# Patient Record
Sex: FEMALE | Race: BLACK OR AFRICAN AMERICAN | Employment: FULL TIME | URBAN - METROPOLITAN AREA
[De-identification: names, ages, dates, MRNs, and addresses within clinical notes are randomized per-mention and may not be internally consistent; named-entity substitution may affect disease eponyms.]

---

## 2019-03-27 ENCOUNTER — OFFICE VISIT (OUTPATIENT)
Dept: PRIMARY CARE CLINIC | Age: 34
End: 2019-03-27
Payer: COMMERCIAL

## 2019-03-27 VITALS
WEIGHT: 167.6 LBS | TEMPERATURE: 97.3 F | OXYGEN SATURATION: 96 % | HEIGHT: 63 IN | HEART RATE: 85 BPM | DIASTOLIC BLOOD PRESSURE: 83 MMHG | SYSTOLIC BLOOD PRESSURE: 127 MMHG | BODY MASS INDEX: 29.7 KG/M2

## 2019-03-27 DIAGNOSIS — M25.50 ARTHRALGIA, UNSPECIFIED JOINT: ICD-10-CM

## 2019-03-27 DIAGNOSIS — G62.9 NEUROPATHY: ICD-10-CM

## 2019-03-27 DIAGNOSIS — L98.9 LESION OF SKIN OF SCALP: Primary | ICD-10-CM

## 2019-03-27 DIAGNOSIS — K21.9 GASTROESOPHAGEAL REFLUX DISEASE WITHOUT ESOPHAGITIS: ICD-10-CM

## 2019-03-27 PROCEDURE — 99203 OFFICE O/P NEW LOW 30 MIN: CPT | Performed by: NURSE PRACTITIONER

## 2019-03-27 RX ORDER — RANITIDINE 150 MG/1
75 TABLET ORAL 2 TIMES DAILY PRN
Qty: 60 TABLET | Refills: 0 | Status: SHIPPED | OUTPATIENT
Start: 2019-03-27 | End: 2020-02-25 | Stop reason: ALTCHOICE

## 2019-03-27 RX ORDER — NICOTINE POLACRILEX 2 MG/1
LOZENGE ORAL
Refills: 0 | COMMUNITY
Start: 2019-03-11 | End: 2019-03-27 | Stop reason: ALTCHOICE

## 2019-03-27 RX ORDER — AZITHROMYCIN 250 MG/1
TABLET, FILM COATED ORAL
Refills: 0 | COMMUNITY
Start: 2019-03-11 | End: 2019-03-27 | Stop reason: ALTCHOICE

## 2019-03-27 ASSESSMENT — ENCOUNTER SYMPTOMS
HEARTBURN: 1
RHINORRHEA: 0
ABDOMINAL PAIN: 0
SHORTNESS OF BREATH: 1
COUGH: 1
DIARRHEA: 0
HEMOPTYSIS: 0
TROUBLE SWALLOWING: 0
WHEEZING: 0
CHOKING: 0
VOMITING: 0
BLOOD IN STOOL: 0

## 2019-03-27 ASSESSMENT — PATIENT HEALTH QUESTIONNAIRE - PHQ9
SUM OF ALL RESPONSES TO PHQ QUESTIONS 1-9: 0
SUM OF ALL RESPONSES TO PHQ9 QUESTIONS 1 & 2: 0
SUM OF ALL RESPONSES TO PHQ QUESTIONS 1-9: 0
1. LITTLE INTEREST OR PLEASURE IN DOING THINGS: 0
2. FEELING DOWN, DEPRESSED OR HOPELESS: 0

## 2019-04-08 ENCOUNTER — HOSPITAL ENCOUNTER (OUTPATIENT)
Age: 34
Discharge: HOME | End: 2019-04-08
Payer: COMMERCIAL

## 2019-04-08 DIAGNOSIS — G62.9 NEUROPATHY: ICD-10-CM

## 2019-04-08 DIAGNOSIS — K21.9 GASTROESOPHAGEAL REFLUX DISEASE WITHOUT ESOPHAGITIS: ICD-10-CM

## 2019-04-08 DIAGNOSIS — M25.50 ARTHRALGIA, UNSPECIFIED JOINT: ICD-10-CM

## 2019-04-08 LAB
ABSOLUTE EOS #: 0.18 K/UL (ref 0–0.44)
ABSOLUTE IMMATURE GRANULOCYTE: <0.03 K/UL (ref 0–0.3)
ABSOLUTE LYMPH #: 2.03 K/UL (ref 1.1–3.7)
ABSOLUTE MONO #: 0.42 K/UL (ref 0.1–1.2)
ALBUMIN SERPL-MCNC: 4.5 G/DL (ref 3.5–5.2)
ALBUMIN/GLOBULIN RATIO: 1.4 (ref 1–2.5)
ALP BLD-CCNC: 95 U/L (ref 35–104)
ALT SERPL-CCNC: 18 U/L (ref 5–33)
ANION GAP SERPL CALCULATED.3IONS-SCNC: 12 MMOL/L (ref 9–17)
AST SERPL-CCNC: 20 U/L
BASOPHILS # BLD: 1 % (ref 0–2)
BASOPHILS ABSOLUTE: 0.04 K/UL (ref 0–0.2)
BILIRUB SERPL-MCNC: 0.17 MG/DL (ref 0.3–1.2)
BUN BLDV-MCNC: 10 MG/DL (ref 6–20)
BUN/CREAT BLD: ABNORMAL (ref 9–20)
CALCIUM SERPL-MCNC: 9.7 MG/DL (ref 8.6–10.4)
CHLORIDE BLD-SCNC: 104 MMOL/L (ref 98–107)
CO2: 25 MMOL/L (ref 20–31)
CREAT SERPL-MCNC: 0.52 MG/DL (ref 0.5–0.9)
DIFFERENTIAL TYPE: ABNORMAL
EOSINOPHILS RELATIVE PERCENT: 3 % (ref 1–4)
GFR AFRICAN AMERICAN: >60 ML/MIN
GFR NON-AFRICAN AMERICAN: >60 ML/MIN
GFR SERPL CREATININE-BSD FRML MDRD: ABNORMAL ML/MIN/{1.73_M2}
GFR SERPL CREATININE-BSD FRML MDRD: ABNORMAL ML/MIN/{1.73_M2}
GLUCOSE BLD-MCNC: 97 MG/DL (ref 70–99)
HCT VFR BLD CALC: 43.6 % (ref 36.3–47.1)
HEMOGLOBIN: 14 G/DL (ref 11.9–15.1)
IMMATURE GRANULOCYTES: 0 %
LYMPHOCYTES # BLD: 35 % (ref 24–43)
MCH RBC QN AUTO: 27.2 PG (ref 25.2–33.5)
MCHC RBC AUTO-ENTMCNC: 32.1 G/DL (ref 28.4–34.8)
MCV RBC AUTO: 84.7 FL (ref 82.6–102.9)
MONOCYTES # BLD: 7 % (ref 3–12)
NRBC AUTOMATED: 0 PER 100 WBC
PDW BLD-RTO: 13.1 % (ref 11.8–14.4)
PLATELET # BLD: 247 K/UL (ref 138–453)
PLATELET ESTIMATE: ABNORMAL
PMV BLD AUTO: 10.1 FL (ref 8.1–13.5)
POTASSIUM SERPL-SCNC: 4.5 MMOL/L (ref 3.7–5.3)
RBC # BLD: 5.15 M/UL (ref 3.95–5.11)
RBC # BLD: ABNORMAL 10*6/UL
RHEUMATOID FACTOR: <10 IU/ML
SEG NEUTROPHILS: 54 % (ref 36–65)
SEGMENTED NEUTROPHILS ABSOLUTE COUNT: 3.16 K/UL (ref 1.5–8.1)
SODIUM BLD-SCNC: 141 MMOL/L (ref 135–144)
TOTAL PROTEIN: 7.8 G/DL (ref 6.4–8.3)
TSH SERPL DL<=0.05 MIU/L-ACNC: 2.85 MIU/L (ref 0.3–5)
WBC # BLD: 5.8 K/UL (ref 3.5–11.3)
WBC # BLD: ABNORMAL 10*3/UL

## 2019-04-08 PROCEDURE — 80053 COMPREHEN METABOLIC PANEL: CPT

## 2019-04-08 PROCEDURE — 86431 RHEUMATOID FACTOR QUANT: CPT

## 2019-04-08 PROCEDURE — 36415 COLL VENOUS BLD VENIPUNCTURE: CPT

## 2019-04-08 PROCEDURE — 86038 ANTINUCLEAR ANTIBODIES: CPT

## 2019-04-08 PROCEDURE — 85025 COMPLETE CBC W/AUTO DIFF WBC: CPT

## 2019-04-08 PROCEDURE — 84443 ASSAY THYROID STIM HORMONE: CPT

## 2019-04-09 DIAGNOSIS — G62.9 NEUROPATHY: ICD-10-CM

## 2019-04-09 DIAGNOSIS — M25.50 ARTHRALGIA, UNSPECIFIED JOINT: Primary | ICD-10-CM

## 2019-04-09 LAB — ANTI-NUCLEAR ANTIBODY (ANA): ABNORMAL

## 2019-04-17 ENCOUNTER — OFFICE VISIT (OUTPATIENT)
Dept: PRIMARY CARE CLINIC | Age: 34
End: 2019-04-17
Payer: COMMERCIAL

## 2019-04-17 VITALS
WEIGHT: 170 LBS | HEART RATE: 86 BPM | SYSTOLIC BLOOD PRESSURE: 127 MMHG | OXYGEN SATURATION: 97 % | TEMPERATURE: 98.2 F | BODY MASS INDEX: 30.11 KG/M2 | DIASTOLIC BLOOD PRESSURE: 88 MMHG

## 2019-04-17 DIAGNOSIS — M79.10 MYALGIA: ICD-10-CM

## 2019-04-17 DIAGNOSIS — R06.02 SHORTNESS OF BREATH: Primary | ICD-10-CM

## 2019-04-17 PROCEDURE — 99213 OFFICE O/P EST LOW 20 MIN: CPT | Performed by: NURSE PRACTITIONER

## 2019-04-17 ASSESSMENT — ENCOUNTER SYMPTOMS
BLOOD IN STOOL: 0
TROUBLE SWALLOWING: 0
VOMITING: 0
SHORTNESS OF BREATH: 1
ABDOMINAL PAIN: 0
WHEEZING: 0
CHOKING: 0
COUGH: 0
RHINORRHEA: 0
DIARRHEA: 0

## 2019-04-17 NOTE — PROGRESS NOTES
[facility]  [address] [place] [id number]  Dept: [phone]  Dept Fax: [phone]    2019     Patricia Deal (:  1985)is a 26 y.o. female, here for evaluation of the following medical concerns:   Chief Complaint   Patient presents with    Follow-up     3 wk f/u. Did labs as asked    Zantac PRN is helping control her reflux very well    Still having problems with legs feeling heavy and tingling after sitting for brief periods  Can sit have bowel movement and legs will numb      . Review of Systems   Constitutional: Negative for appetite change, chills, fatigue, fever and unexpected weight change. HENT: Negative for congestion, hearing loss, postnasal drip, rhinorrhea and trouble swallowing. Eyes: Negative for visual disturbance. Respiratory: Positive for shortness of breath (after going up stairs). Negative for cough, choking and wheezing. Cardiovascular: Negative for chest pain and palpitations. Gastrointestinal: Negative for abdominal pain, blood in stool, diarrhea and vomiting. Endocrine: Negative for polydipsia and polyuria. Genitourinary: Negative for frequency and hematuria. Musculoskeletal: Positive for arthralgias. Negative for myalgias and neck pain. Swelling in legs   Skin: Negative for rash. Allergic/Immunologic: Negative for environmental allergies. Neurological: Positive for numbness. Negative for dizziness, seizures, syncope and headaches. Hematological: Does not bruise/bleed easily. Psychiatric/Behavioral: Negative for suicidal ideas. The patient is not nervous/anxious. Prior to Visit Medications    Medication Sig Taking?  Authorizing Provider   ranitidine (ZANTAC) 150 MG tablet Take 0.5 tablets by mouth 2 times daily as needed for Heartburn Yes Bridger Andrade, APRN - CNP   Bismuth Subsalicylate 741 MG TABS Take 1 tablet by mouth as needed  Historical Provider, MD        Social History     Tobacco Use    Smoking status: Never Smoker    Smokeless tobacco: Never Used   Substance Use Topics    Alcohol use: Yes     Comment: ocasional        Vitals:    04/17/19 1148   BP: 127/88   Site: Left Upper Arm   Position: Sitting   Cuff Size: Medium Adult   Pulse: 86   Temp: 98.2 °F (36.8 °C)   TempSrc: Oral   SpO2: 97%   Weight: 170 lb (77.1 kg)     Estimated body mass index is 30.11 kg/m² as calculated from the following:    Height as of 3/27/19: 5' 3\" (1.6 m). Weight as of this encounter: 170 lb (77.1 kg). DIAGNOSTIC FINDINGS:  CBC:  Lab Results   Component Value Date    WBC 5.8 04/08/2019    HGB 14.0 04/08/2019     04/08/2019       BMP:    Lab Results   Component Value Date     04/08/2019    K 4.5 04/08/2019     04/08/2019    CO2 25 04/08/2019    BUN 10 04/08/2019    CREATININE 0.52 04/08/2019    GLUCOSE 97 04/08/2019       HEMOGLOBIN A1C: No results found for: LABA1C    FASTING LIPID PANEL:No results found for: CHOL, HDL, TRIG    Physical Exam   Constitutional: She is oriented to person, place, and time. She appears well-developed and well-nourished. She is cooperative. No distress. HENT:   Head: Normocephalic. Eyes: Pupils are equal, round, and reactive to light. No scleral icterus. Neck: Normal range of motion. Neck supple. Cardiovascular: Normal rate and regular rhythm. Pulmonary/Chest: Effort normal and breath sounds normal.   Abdominal: Soft. Musculoskeletal: She exhibits no edema. Neurological: She is alert and oriented to person, place, and time. Coordination normal.   Skin: Skin is warm and dry. Psychiatric: She has a normal mood and affect. Her behavior is normal. Judgment and thought content normal.   Nursing note and vitals reviewed. ASSESSMENT     Diagnosis Orders   1. Shortness of breath  Full PFT Study With Bronchodilator   2.  Myalgia            PLAN:  No orders of the defined types were placed in this encounter. 1.  RF, CBC, TSH and CMP all WNL. HAILY came back equivocal, will repeat in 3-4 week. 2. She is SOB with mild exertion, was very active in sports in her youth. No hx of asthma, non-smoker. Check PFT's      FOLLOW UP AND INSTRUCTIONS:  Return in about 1 month (around 5/15/2019). · Henri received counseling on the following healthy behaviors:exercise    · Discussed use, benefit, and side effects of prescribed medications. Barriers to medication compliance addressed. All patient questions answered. Pt verbalized understanding of all instructions given. · Patient given educational materials - see patient instructions      · Patient advised to contact scheduling offices for any referrals or imaging orders  placed today if they have not been contacted in 48 hours. Return in about 1 month (around 5/15/2019). An electronic signature was used to authenticate this note. --MIRA Lerma CNP on 4/17/2019 at 12:16 PM  Visit Information    Have you changed or started any medications since your last visit including any over-the-counter medicines, vitamins, or herbal medicines? no   Are you having any side effects from any of your medications? -  no  Have you stopped taking any of your medications? Is so, why? -  no    Have you seen any other physician or provider since your last visit? No  Have you had any other diagnostic tests since your last visit? Yes - Records Requested  Have you been seen in the emergency room and/or had an admission to a hospital since we last saw you? No  Have you had your routine dental cleaning in the past 6 months? yes    Have you activated your [employer] account? If not, what are your barriers?  No: pending       Patient Care Team:  MIRA Lerma CNP as PCP - General (Family Medicine)    Medical History Review  Past Medical, Family, and Social History reviewed and does not contribute to the patient presenting condition    Health Maintenance Topic Date Due    Varicella Vaccine (1 of 2 - 13+ 2-dose series) 11/28/1996    HIV screen  11/28/1998    DTaP/Tdap/Td vaccine (1 - Tdap) 11/28/2002    Cervical cancer screen  11/28/2004    Flu vaccine (Season Ended) 03/27/2020 (Originally 9/1/2019)    Pneumococcal 0-64 years Vaccine  Aged Out

## 2019-04-29 ENCOUNTER — OFFICE VISIT (OUTPATIENT)
Dept: DERMATOLOGY | Age: 34
End: 2019-04-29
Payer: COMMERCIAL

## 2019-04-29 VITALS
HEIGHT: 63 IN | HEART RATE: 101 BPM | WEIGHT: 172.8 LBS | BODY MASS INDEX: 30.62 KG/M2 | OXYGEN SATURATION: 98 % | SYSTOLIC BLOOD PRESSURE: 122 MMHG | DIASTOLIC BLOOD PRESSURE: 89 MMHG

## 2019-04-29 DIAGNOSIS — D48.5 NEOPLASM OF UNCERTAIN BEHAVIOR OF SKIN: ICD-10-CM

## 2019-04-29 DIAGNOSIS — L70.0 ACNE VULGARIS: Primary | ICD-10-CM

## 2019-04-29 PROCEDURE — 99202 OFFICE O/P NEW SF 15 MIN: CPT | Performed by: DERMATOLOGY

## 2019-04-29 RX ORDER — SPIRONOLACTONE 50 MG/1
TABLET, FILM COATED ORAL
Qty: 30 TABLET | Refills: 2 | Status: SHIPPED | OUTPATIENT
Start: 2019-04-29 | End: 2019-07-31 | Stop reason: SDUPTHER

## 2019-04-29 RX ORDER — CLINDAMYCIN PHOSPHATE 10 UG/ML
LOTION TOPICAL
Qty: 60 ML | Refills: 3 | Status: SHIPPED | OUTPATIENT
Start: 2019-04-29 | End: 2019-07-31 | Stop reason: SDUPTHER

## 2019-04-29 NOTE — PROGRESS NOTES
Dermatology Patient Note  [facility] DERMATOLOGY  [address] [facility]  [address] [place] [place] [address]  Dept: [phone]  Dept Fax: [phone]      VISITDATE: 4/29/2019   REFERRING PROVIDER: MIRA Watson *      Anisha Davila is a 26 y.o. female  who presents today in the office for:    New Patient (Pt states that she had a lesion in her scalp since she was 13. She has had no treatment for it. She wanted to be treated for acne as well. She states that she was tried on Differin Gel. )      HISTORY OF PRESENT ILLNESS:  26 y.o. female presenting for acne  Location: face, back  Duration: since teenage years  Symptoms: pain  Course: persistent  Exacerbating factors: unsure  Prior treatments: differin gel  Also complains of lesion on scalp that is painful, been there since 11      CURRENT MEDICATIONS:   Current Outpatient Medications   Medication Sig Dispense Refill    spironolactone (ALDACTONE) 50 MG tablet Take one half tablet PO daily x 7 days, then increase to full tablet daily 30 tablet 2    tretinoin (RETIN-A) 0.025 % cream Apply pea sized amount to face, chest and back nightly 45 g 3    clindamycin (CLEOCIN T) 1 % lotion Apply to face, chest and back daily 60 mL 3    ranitidine (ZANTAC) 150 MG tablet Take 0.5 tablets by mouth 2 times daily as needed for Heartburn 60 tablet 0     No current facility-administered medications for this visit. ALLERGIES:   Allergies   Allergen Reactions    Latex     Other      Some selective fruits       SOCIAL HISTORY:  Social History     Tobacco Use    Smoking status: Never Smoker    Smokeless tobacco: Never Used   Substance Use Topics    Alcohol use: Yes     Comment: ocasional       REVIEW OF SYSTEMS:  Review of Systems  Skin: Denies any new changing, growing orbleeding lesions or rashes except as described in the HPI   Constitutional: Denies fevers, chills, and malaise.     PHYSICAL EXAM:   /89 (Site: Right Upper Arm, Position: Sitting, Cuff Size: Large Adult)   Pulse 101   Ht 5' 3\" (1.6 m)   Wt 172 lb 12.8 oz (78.4 kg)   LMP 04/02/2019 (Approximate)   SpO2 98%   BMI 30.61 kg/m²     General Exam:  General Appearance: No acute distress, Well nourished     Neuro: Alert and oriented to person, place and time  Psych: Normal affect   Lymph Node: Not performed    Cutaneous Exam: Performed as documented in clinic note below. Acne exam, which includes the head/face, neck, chest, and back was performed    Pertinent Physical Exam Findings:  Physical Exam  Left occipital scalp with eroded pink-tan pedunculated nodule  Face with acneiform papules and comedones    Photo surveillance performed: No    Medical Necessity of Exam Performed:   Distribution of patient concerns    Additional Diagnostic Testing performed during exam: Not performed ,  Not performed    ASSESSMENT:   Diagnosis Orders   1. Acne vulgaris  spironolactone (ALDACTONE) 50 MG tablet    tretinoin (RETIN-A) 0.025 % cream    clindamycin (CLEOCIN T) 1 % lotion   2. Neoplasm of uncertain behavior of skin         Plan of Action is as Follows:  Assessment   1. Acne vulgaris, mild-mod mixed  As the patient has clinical and historical evidence of hormonally-driven acne, we jointly decided to begin spironolactone therapy. The patient denies any current or planned pregnancies, personal or familial estrogen-dependent malignancies, renal insufficiency, or clinically significant abnormal uterine bleeding. The patient was informed of common side effects such as increased urination/urinary frequency, menstrual irregularities with mid-cycle spotting, breast tenderness, decreased libido, fatigue, headache, and dizziness  Patient informed to stop taking medication and to immediately report any new onset muscle cramps or weakness, or palpitations. Patient informed that pregnancy needs to be avoided by use of birth control in order to prevent feminization and hypospadias of a male fetus.   Discussed risk of elevated potassium and the need to stay away from potassium supplements while on the medication.  - BPO wash qam  - spironolactone (ALDACTONE) 50 MG tablet; Take one half tablet PO daily x 7 days, then increase to full tablet daily  Dispense: 30 tablet; Refill: 2  - tretinoin (RETIN-A) 0.025 % cream; Apply pea sized amount to face, chest and back nightly  Dispense: 45 g; Refill: 3  - clindamycin (CLEOCIN T) 1 % lotion; Apply to face, chest and back daily  Dispense: 60 mL; Refill: 3    2. 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 Other, left occpital scalp  - patient wants to reschedule for shave removal            Patient Instructions   Mornin. Wash with over the counter benzoyl peroxide wash (examples include: Panoxyl Wash, Acne Free brand oil-free acne cleanser, Neutrogena Clear Pore Cleanser/Mask, Clean and Clear advantage 3 in 1 exfoliating cleanser, Clean and Clear Continuous Control Acne Cleanser, Oxy maximum face wash). Dry your face with white towel to prevent bleaching of clothing. 2. Apply clindamycin 1% lotion to the face. 3. Apply an oil-free, non-comedogenic moisturizer, ideally with SPF 15+ in it. Night time:   1. Wash with a gentle face wash (such as Cerave or Cetaphil)  2. Apply a pea-sized amount of Tretinoin 0.025% cream/onto your finger. Dab the medicine onto your forehead, nose, chin, and each cheek. Then gently spread a thin layer across the entire face. Do not wash off this medicine. These medications can also be used on your chest, back and shoulder areas. 3. Apply an oil-free, non-comedogenic moisturizer. 4. Take Spironolactone--start with 1/2 tablet at bedtime for 7 days. If well tolerated, increase to full tablet nightly. It is important to remember that oil glands respond very slowly and your skin will not change overnight.   Your skin may get worse during the first weeks of treatment because all of the clogged pores are opening to the surface. Try to be consistent and follow these instructions from your doctor. If your acne has not responded to these treatments in 2-3 months, your doctor may recommend other medications. Topical acne medications can cause irritation, redness, and dryness, especially when you first start them. If your prescribed topical cream or gel is too irritating at first, try using it every other day or once every 3 days instead of every day. As your skin becomes less sensitive, you may be able to start to use it every day. To help soothe the dryness, you can use an oil-free, \"non-comedogenic\" moisturizer, ideally with SPF in it. Some products available include: Cetaphil Lotion, Cerave Lotion, Neutrogenia Moisturizer and Aveeno Moisturizer. Some people find that applying their moisturizer immediately prior to the topical medication helps, and studies suggest that it does not reduce effectiveness. Please have your pharmacist call our office if you are having trouble getting your medications or need refills. Some insurance companies will not cover tretinoin; however, you may shop around for the best out-of-pocket price using the coupon website [employer]. Topical and oral acne medications are not safe for pregnant women. No acne medications should be used by pregnant women without first consulting their physician. Follow-up: No follow-ups on file. This note was created with the assistance of a speech-recognition program.  Although the intention is to generate a document that actually reflects the content of the visit, no guarantees can be provided that every mistake has been identified and corrected byediting.     Electronically signed by Isaiah Bradley MD on 4/29/19 at 11:42 AM

## 2019-04-29 NOTE — PATIENT INSTRUCTIONS
Mornin. Wash with over the counter benzoyl peroxide wash (examples include: Panoxyl Wash, Acne Free brand oil-free acne cleanser, Neutrogena Clear Pore Cleanser/Mask, Clean and Clear advantage 3 in 1 exfoliating cleanser, Clean and Clear Continuous Control Acne Cleanser, Oxy maximum face wash). Dry your face with white towel to prevent bleaching of clothing. 2. Apply clindamycin 1% lotion to the face. 3. Apply an oil-free, non-comedogenic moisturizer, ideally with SPF 15+ in it. Night time:   1. Wash with a gentle face wash (such as Cerave or Cetaphil)  2. Apply a pea-sized amount of Tretinoin 0.025% cream/onto your finger. Dab the medicine onto your forehead, nose, chin, and each cheek. Then gently spread a thin layer across the entire face. Do not wash off this medicine. These medications can also be used on your chest, back and shoulder areas. 3. Apply an oil-free, non-comedogenic moisturizer. 4. Take Spironolactone--start with 1/2 tablet at bedtime for 7 days. If well tolerated, increase to full tablet nightly. It is important to remember that oil glands respond very slowly and your skin will not change overnight. Your skin may get worse during the first weeks of treatment because all of the clogged pores are opening to the surface. Try to be consistent and follow these instructions from your doctor. If your acne has not responded to these treatments in 2-3 months, your doctor may recommend other medications. Topical acne medications can cause irritation, redness, and dryness, especially when you first start them. If your prescribed topical cream or gel is too irritating at first, try using it every other day or once every 3 days instead of every day. As your skin becomes less sensitive, you may be able to start to use it every day. To help soothe the dryness, you can use an oil-free, \"non-comedogenic\" moisturizer, ideally with SPF in it.   Some products available include: Cetaphil Lotion, Cerave Lotion, Neutrogenia Moisturizer and Aveeno Moisturizer. Some people find that applying their moisturizer immediately prior to the topical medication helps, and studies suggest that it does not reduce effectiveness. Please have your pharmacist call our office if you are having trouble getting your medications or need refills. Some insurance companies will not cover tretinoin; however, you may shop around for the best out-of-pocket price using the [employer] website [employer]. Topical and oral acne medications are not safe for pregnant women. No acne medications should be used by pregnant women without first consulting their physician.

## 2019-06-25 ENCOUNTER — TELEPHONE (OUTPATIENT)
Dept: OBGYN | Age: 34
End: 2019-06-25

## 2019-07-31 ENCOUNTER — OFFICE VISIT (OUTPATIENT)
Dept: DERMATOLOGY | Age: 34
End: 2019-07-31
Payer: COMMERCIAL

## 2019-07-31 ENCOUNTER — HOSPITAL ENCOUNTER (OUTPATIENT)
Age: 34
Setting detail: SPECIMEN
Discharge: HOME | End: 2019-07-31
Payer: COMMERCIAL

## 2019-07-31 VITALS
WEIGHT: 175 LBS | BODY MASS INDEX: 31.01 KG/M2 | SYSTOLIC BLOOD PRESSURE: 133 MMHG | HEART RATE: 76 BPM | DIASTOLIC BLOOD PRESSURE: 91 MMHG | OXYGEN SATURATION: 98 % | HEIGHT: 63 IN

## 2019-07-31 DIAGNOSIS — D48.5 NEOPLASM OF UNCERTAIN BEHAVIOR OF SKIN: Primary | ICD-10-CM

## 2019-07-31 DIAGNOSIS — L70.0 ACNE VULGARIS: ICD-10-CM

## 2019-07-31 PROCEDURE — 11102 TANGNTL BX SKIN SINGLE LES: CPT | Performed by: DERMATOLOGY

## 2019-07-31 PROCEDURE — 99213 OFFICE O/P EST LOW 20 MIN: CPT | Performed by: DERMATOLOGY

## 2019-07-31 RX ORDER — LIDOCAINE HYDROCHLORIDE AND EPINEPHRINE 10; 10 MG/ML; UG/ML
1 INJECTION, SOLUTION INFILTRATION; PERINEURAL ONCE
Status: COMPLETED | OUTPATIENT
Start: 2019-07-31 | End: 2019-07-31

## 2019-07-31 RX ORDER — SPIRONOLACTONE 50 MG/1
TABLET, FILM COATED ORAL
Qty: 30 TABLET | Refills: 2 | Status: SHIPPED | OUTPATIENT
Start: 2019-07-31 | End: 2021-05-10

## 2019-07-31 RX ORDER — CLINDAMYCIN PHOSPHATE 10 UG/ML
LOTION TOPICAL
Qty: 60 ML | Refills: 3 | Status: SHIPPED | OUTPATIENT
Start: 2019-07-31 | End: 2021-05-10

## 2019-07-31 RX ADMIN — LIDOCAINE HYDROCHLORIDE AND EPINEPHRINE 1 ML: 10; 10 INJECTION, SOLUTION INFILTRATION; PERINEURAL at 12:46

## 2019-08-02 ENCOUNTER — TELEPHONE (OUTPATIENT)
Dept: DERMATOLOGY | Age: 34
End: 2019-08-02

## 2019-08-02 LAB — DERMATOLOGY PATHOLOGY REPORT: NORMAL

## 2019-10-09 ENCOUNTER — HOSPITAL ENCOUNTER (OUTPATIENT)
Age: 34
Discharge: HOME | End: 2019-10-09
Payer: COMMERCIAL

## 2019-10-09 DIAGNOSIS — G62.9 NEUROPATHY: ICD-10-CM

## 2019-10-09 DIAGNOSIS — M25.50 ARTHRALGIA, UNSPECIFIED JOINT: ICD-10-CM

## 2019-10-09 PROCEDURE — 36415 COLL VENOUS BLD VENIPUNCTURE: CPT

## 2019-10-09 PROCEDURE — 86235 NUCLEAR ANTIGEN ANTIBODY: CPT

## 2019-10-09 PROCEDURE — 86038 ANTINUCLEAR ANTIBODIES: CPT

## 2019-10-09 PROCEDURE — 86225 DNA ANTIBODY NATIVE: CPT

## 2019-10-10 LAB
ANA REFERENCE RANGE:: ABNORMAL
ANTI DNA DOUBLE STRANDED: 50 IU/ML
ANTI JO-1 IGG: 12 U/ML
ANTI RNP AB: 128 U/ML
ANTI SSA: 26 U/ML
ANTI SSB: 13 U/ML
ANTI-CENTROMERE: 8 U/ML
ANTI-NUCLEAR ANTIBODY (ANA): POSITIVE
ANTI-SCLERODERMA: 49 U/ML
ANTI-SMITH: 50 U/ML
HISTONE ANTIBODY: 12 U/ML

## 2019-10-11 ENCOUNTER — TELEPHONE (OUTPATIENT)
Dept: PRIMARY CARE CLINIC | Age: 34
End: 2019-10-11

## 2019-10-11 DIAGNOSIS — M79.10 MYALGIA: ICD-10-CM

## 2019-10-11 DIAGNOSIS — M25.50 ARTHRALGIA, UNSPECIFIED JOINT: ICD-10-CM

## 2019-10-11 DIAGNOSIS — R76.8 POSITIVE ANA (ANTINUCLEAR ANTIBODY): Primary | ICD-10-CM

## 2019-10-11 DIAGNOSIS — R76.8 ANTI-RNP ANTIBODIES PRESENT: ICD-10-CM

## 2019-10-17 ENCOUNTER — OFFICE VISIT (OUTPATIENT)
Dept: PRIMARY CARE CLINIC | Age: 34
End: 2019-10-17
Payer: COMMERCIAL

## 2019-10-17 VITALS
DIASTOLIC BLOOD PRESSURE: 88 MMHG | HEART RATE: 70 BPM | BODY MASS INDEX: 30.47 KG/M2 | SYSTOLIC BLOOD PRESSURE: 131 MMHG | OXYGEN SATURATION: 98 % | TEMPERATURE: 98.4 F | WEIGHT: 172 LBS

## 2019-10-17 DIAGNOSIS — G47.19 EXCESSIVE DAYTIME SLEEPINESS: ICD-10-CM

## 2019-10-17 DIAGNOSIS — G62.9 NEUROPATHY: Primary | ICD-10-CM

## 2019-10-17 PROCEDURE — 99213 OFFICE O/P EST LOW 20 MIN: CPT | Performed by: NURSE PRACTITIONER

## 2019-10-17 RX ORDER — GABAPENTIN 300 MG/1
300 CAPSULE ORAL 2 TIMES DAILY
Qty: 60 CAPSULE | Refills: 2 | Status: SHIPPED | OUTPATIENT
Start: 2019-10-17 | End: 2021-01-05 | Stop reason: SDUPTHER

## 2019-10-17 ASSESSMENT — ENCOUNTER SYMPTOMS
WHEEZING: 0
VOMITING: 0
ABDOMINAL PAIN: 0
COUGH: 1
DIARRHEA: 0
HEMOPTYSIS: 0
TROUBLE SWALLOWING: 0
BLOOD IN STOOL: 0
HEARTBURN: 1
RHINORRHEA: 0
CHOKING: 0
SHORTNESS OF BREATH: 1

## 2019-10-21 ENCOUNTER — HOSPITAL ENCOUNTER (OUTPATIENT)
Dept: PULMONOLOGY | Age: 34
Discharge: HOME | End: 2019-10-21
Payer: COMMERCIAL

## 2019-10-21 DIAGNOSIS — R06.02 SHORTNESS OF BREATH: ICD-10-CM

## 2019-10-21 PROCEDURE — 94640 AIRWAY INHALATION TREATMENT: CPT

## 2019-10-21 PROCEDURE — 94060 EVALUATION OF WHEEZING: CPT

## 2019-10-21 PROCEDURE — 94726 PLETHYSMOGRAPHY LUNG VOLUMES: CPT

## 2019-10-21 PROCEDURE — 94727 GAS DIL/WSHOT DETER LNG VOL: CPT

## 2019-10-21 PROCEDURE — 94729 DIFFUSING CAPACITY: CPT

## 2019-10-21 PROCEDURE — 94664 DEMO&/EVAL PT USE INHALER: CPT

## 2020-02-25 ENCOUNTER — OFFICE VISIT (OUTPATIENT)
Dept: PRIMARY CARE CLINIC | Age: 35
End: 2020-02-25
Payer: COMMERCIAL

## 2020-02-25 VITALS
TEMPERATURE: 99 F | DIASTOLIC BLOOD PRESSURE: 78 MMHG | OXYGEN SATURATION: 96 % | BODY MASS INDEX: 31.71 KG/M2 | HEART RATE: 88 BPM | SYSTOLIC BLOOD PRESSURE: 114 MMHG | WEIGHT: 179 LBS

## 2020-02-25 PROCEDURE — 99213 OFFICE O/P EST LOW 20 MIN: CPT | Performed by: NURSE PRACTITIONER

## 2020-02-25 RX ORDER — CHOLECALCIFEROL (VITAMIN D3) 1250 MCG
50000 CAPSULE ORAL
COMMUNITY
End: 2021-05-10

## 2020-02-25 RX ORDER — IPRATROPIUM BROMIDE 21 UG/1
SPRAY, METERED NASAL
Qty: 1 BOTTLE | Refills: 3 | Status: SHIPPED | OUTPATIENT
Start: 2020-02-25 | End: 2021-05-10

## 2020-02-25 ASSESSMENT — ENCOUNTER SYMPTOMS
RHINORRHEA: 0
EYE REDNESS: 0
COUGH: 0
VOMITING: 0
SINUS PRESSURE: 1
SORE THROAT: 0
EYE PAIN: 0

## 2020-12-16 ENCOUNTER — TELEPHONE (OUTPATIENT)
Dept: PRIMARY CARE CLINIC | Age: 35
End: 2020-12-16

## 2020-12-16 NOTE — TELEPHONE ENCOUNTER
Patient called wanting to know if she could get a note from you stating she can only work 40 hours per week, that she has a  and other things going on and I advised her that she would need to get FMLA for that and she stated she already has an FMLA that her employer is just requesting a letter. Please advise      Health Maintenance   Topic Date Due    Varicella vaccine (1 of 2 - 2-dose childhood series) 1984    HIV screen  1998    DTaP/Tdap/Td vaccine (1 - Tdap) 2002    Cervical cancer screen  2004    Potassium monitoring  2020    Creatinine monitoring  2020    Flu vaccine (1) 2020    Hepatitis A vaccine  Aged Out    Hepatitis B vaccine  Aged Out    Hib vaccine  Aged Out    Meningococcal (ACWY) vaccine  Aged Out    Pneumococcal 0-64 years Vaccine  Aged Out             (applicable per patient's age: Cancer Screenings, Depression Screening, Fall Risk Screening, Immunizations)    AST (U/L)   Date Value   2019 20     ALT (U/L)   Date Value   2019 18     BUN (mg/dL)   Date Value   2019 10      (goal A1C is < 7)   (goal LDL is <100) need 30-50% reduction from baseline     BP Readings from Last 3 Encounters:   20 114/78   10/17/19 131/88   19 (!) 133/91    (goal /80)      All Future Testing planned in [facility]:  Lab Frequency Next Occurrence       Next Visit Date:  No future appointments.          Patient Active Problem List:     Shortness of breath     Myalgia

## 2020-12-16 NOTE — TELEPHONE ENCOUNTER
She needs a visit to discuss her symptoms and reason for FMLA. Last office visit 8 months ago.   Can be virtual

## 2021-01-05 ENCOUNTER — VIRTUAL VISIT (OUTPATIENT)
Dept: PRIMARY CARE CLINIC | Age: 36
End: 2021-01-05
Payer: COMMERCIAL

## 2021-01-05 DIAGNOSIS — G62.9 NEUROPATHY: ICD-10-CM

## 2021-01-05 DIAGNOSIS — E55.9 VITAMIN D DEFICIENCY: Primary | ICD-10-CM

## 2021-01-05 PROCEDURE — 99213 OFFICE O/P EST LOW 20 MIN: CPT | Performed by: NURSE PRACTITIONER

## 2021-01-05 RX ORDER — GABAPENTIN 300 MG/1
300 CAPSULE ORAL 2 TIMES DAILY
Qty: 60 CAPSULE | Refills: 2 | Status: SHIPPED
Start: 2021-01-05 | End: 2021-05-10 | Stop reason: SINTOL

## 2021-01-05 ASSESSMENT — ENCOUNTER SYMPTOMS
BLOOD IN STOOL: 0
WHEEZING: 0
SHORTNESS OF BREATH: 1
COUGH: 0
ABDOMINAL PAIN: 0
TROUBLE SWALLOWING: 0
VOMITING: 0
CHOKING: 0
RHINORRHEA: 0
DIARRHEA: 0

## 2021-01-05 NOTE — PROGRESS NOTES
Henri Riojas is a 38 y.o. female evaluated virtual visit via [employer] on 2021. Consent:  She and/or health care decision maker is aware that that she may receive a bill for this telephone service, depending on her insurance coverage, and has provided verbal consent to proceed: Yes      Documentation:  I communicated with the patient and/or health care decision maker about FMLA. Details of this discussion including any medical advice provided below      I affirm this is a Patient Initiated Episode with an Established Patient who has not had a related appointment within my department in the past 7 days or scheduled within the next 24 hours. Total Time: minutes: 21-30 minutes    Note: not billable if this call serves to triage the patient into an appointment for the relevant concern      Lore Webber                  2021    TELEHEALTH EVALUATION -- Audio/Visual (During CMOCL-35 public health emergency)    Patient and physician are located in their individual homes    HPI:    Henri Westbrook (:  1985) has requested an audio only/video evaluation for the following concern(s):        Currently 6 months post partum. Only current medication is a multivitamin, gynecology to prescribe an oral contraceptive however she is not ready to start taking the medication. No complications during her pregnancy, no gestational diabetes. Having trouble with work, is salaried and finding 12-13 hours a day is too much. Works for [employer]. She is still experiencing bilateral upper extremity numbness, typically worse in the morning. She states she wakes up feeling swollen and stiff. She did have a visit twice in 2019 with rheumatology, however her work-up was minimal as she was pregnant at the time. Review of Systems   Constitutional: Negative for appetite change, chills, fatigue, fever and unexpected weight change.    HENT: Negative for congestion, hearing loss, postnasal drip, rhinorrhea and trouble swallowing. Eyes: Negative for visual disturbance. Respiratory: Positive for shortness of breath (after going up stairs). Negative for cough, choking and wheezing. Cardiovascular: Negative for chest pain and palpitations. Gastrointestinal: Negative for abdominal pain, blood in stool, diarrhea and vomiting. Endocrine: Negative for polydipsia and polyuria. Genitourinary: Negative for difficulty urinating, frequency and hematuria. Musculoskeletal: Positive for arthralgias. Negative for myalgias and neck pain. Swelling in legs   Skin: Negative for rash. Allergic/Immunologic: Negative for environmental allergies. Neurological: Positive for numbness. Negative for dizziness, seizures, syncope and headaches. Hematological: Does not bruise/bleed easily. Psychiatric/Behavioral: Negative for suicidal ideas. The patient is not nervous/anxious. Prior to Visit Medications    Medication Sig Taking? Authorizing Provider   gabapentin (NEURONTIN) 300 MG capsule Take 1 capsule by mouth 2 times daily for 180 days.  Intended supply: 30 days Yes MIRA Rosenthal CNP   Prenatal Vit-Fe Fumarate-FA (PRENATAL 19 PO) Take by mouth Yes Historical Provider, MD   Cholecalciferol (VITAMIN D3) 1.25 MG (48418 UT) CAPS Take 50,000 Units by mouth  Historical Provider, MD   ipratropium (ATROVENT) 0.03 % nasal spray 2 sprays each nostril 2-3 times a day as needed for congestion  Patient not taking: Reported on 1/5/2021  MIRA Rosenthal CNP   spironolactone (ALDACTONE) 50 MG tablet Take one half tablet PO daily x 7 days, then increase to full tablet daily  Patient not taking: Reported on 10/17/2019  Tl Martinez MD   tretinoin (RETIN-A) 0.025 % cream Apply pea sized amount to face, chest and back nightly  Patient not taking: Reported on 2/25/2020  Tl Martinez MD   clindamycin (CLEOCIN T) 1 % lotion Apply to face, chest and back daily  Patient not taking: Reported on 2/25/2020  Linda LAMB MD Ajit       Social History     Tobacco Use    Smoking status: Never Smoker    Smokeless tobacco: Never Used   Substance Use Topics    Alcohol use: Yes     Comment: ocasional    Drug use: No        No past medical history on file. Allergies   Allergen Reactions    Latex     Other      Some selective fruits   , No past medical history on file.,   Past Surgical History:   Procedure Laterality Date    WISDOM TOOTH EXTRACTION     ,   Social History     Tobacco Use    Smoking status: Never Smoker    Smokeless tobacco: Never Used   Substance Use Topics    Alcohol use: Yes     Comment: ocasional    Drug use: No       CBC:  Lab Results   Component Value Date    WBC 5.8 04/08/2019    HGB 14.0 04/08/2019     04/08/2019       BMP:    Lab Results   Component Value Date     04/08/2019    K 4.5 04/08/2019     04/08/2019    CO2 25 04/08/2019    BUN 10 04/08/2019    CREATININE 0.52 04/08/2019    GLUCOSE 97 04/08/2019       HEMOGLOBIN A1C: No results found for: LABA1C    FASTING LIPID PANEL:No results found for: CHOL, HDL, TRIG      RECORD REVIEW: Previous medical records were reviewed at today's visit. PHYSICAL EXAMINATION:    Vital Signs: (As obtained by patient/caregiver at home)    No flowsheet data found. Physical Exam  Constitutional:       Appearance: Normal appearance. She is not toxic-appearing. HENT:      Head: Normocephalic. Right Ear: External ear normal.      Left Ear: External ear normal.      Nose: Nose normal.      Mouth/Throat:      Mouth: Mucous membranes are moist.   Eyes:      General: No scleral icterus. Right eye: No discharge. Left eye: No discharge. Conjunctiva/sclera: Conjunctivae normal.   Neck:      Musculoskeletal: Normal range of motion. Pulmonary:      Effort: Pulmonary effort is normal.   Skin:     Coloration: Skin is not jaundiced. Neurological:      Mental Status: She is alert and oriented to person, place, and time. Psychiatric:         Mood and Affect: Mood normal.         Behavior: Behavior normal.         Thought Content: Thought content normal.             RECORD REVIEW: Previous medical records were reviewed at today's visit. The past family, medical and social histories were reviewed and unchanged with the exceptions of what is mentioned in this note. Due to this being a TeleHealth encounter, evaluation of the following organ systems is limited: Vitals/Constitutional/EENT/Resp/CV/GI//MS/Neuro/Skin/Heme-Lymph-Imm. ASSESSMENT/PLAN:  Armaan Vaughn was seen today for pain. Diagnoses and all orders for this visit:    Vitamin D deficiency  -     Vitamin D 25 Hydroxy; Future    Neuropathy  -     Vitamin B12; Future  -     gabapentin (NEURONTIN) 300 MG capsule; Take 1 capsule by mouth 2 times daily for 180 days. Intended supply: 30 days    Will restart gabapentin today for neuropathy. Last saw rheumatology December 2019, patient was currently pregnant so work-up was limited. 12 was in the 500s, vitamin D was 14.9. Believe the HAILY was a false positive. We will recheck vitamin D and B12 today. Courage patient to reschedule follow-up with rheumatology as she is no longer pregnant  Given persistent symptoms of neuropathy, stiffness, possible fibromyalgia component. Will continue FMLA in addition to a limited 40-hour work week, 8 hours a day. Return in about 4 weeks (around 2/2/2021) for HTN. An  electronic signature was used to authenticate this note. --MIRA Watts - CNP on 1/5/2021 at 1:18 PM    This note is created with the assistance of a speech-recognition program. While intending to generate a document that actually reflects the content of the visit, the document can still have some mistakes which may not have been identified and corrected by editing. 9    Pursuant to the emergency declaration under the [address] [facility], [address]5 waiver authority and the Coronavirus Preparedness and Response Supplemental Appropriations Act, this Virtual  Visit was conducted, with patient's consent, to reduce the patient's risk of exposure to COVID-19 and provide continuity of care for an established patient. Services were provided through a video synchronous discussion virtually to substitute for in-person clinic visit.

## 2021-01-05 NOTE — PROGRESS NOTES
No follow-ups on file. An electronic signature was used to authenticate this note. --Blanca Johnson MA on 1/5/2021 at 12:50 PM  Visit Information    Have you changed or started any medications since your last visit including any over-the-counter medicines, vitamins, or herbal medicines? no   Are you having any side effects from any of your medications? -  no  Have you stopped taking any of your medications? Is so, why? -  no    Have you seen any other physician or provider since your last visit? No  Have you had any other diagnostic tests since your last visit? No  Have you been seen in the emergency room and/or had an admission to a hospital since we last saw you? No  Have you had your routine dental cleaning in the past 6 months? no    Have you activated your [employer] account? If not, what are your barriers?  Yes     Patient Care Team:  MIRA Watts CNP as PCP - General (Family Medicine)  MIRA Watts CNP as PCP [facility]    Medical History Review  Past Medical, Family, and Social History reviewed and does not contribute to the patient presenting condition    Health Maintenance   Topic Date Due    Hepatitis C screen  1985    Varicella vaccine (1 of 2 - 2-dose childhood series) 11/28/1984    HIV screen  11/28/1998    Cervical cancer screen  11/28/2004    Potassium monitoring  04/08/2020    Creatinine monitoring  04/08/2020    Flu vaccine (1) 09/01/2020    DTaP/Tdap/Td vaccine (2 - Td) 05/08/2030    Hepatitis A vaccine  Aged Out    Hepatitis B vaccine  Aged Out    Hib vaccine  Aged Out    Meningococcal (ACWY) vaccine  Aged Out    Pneumococcal 0-64 years Vaccine  Aged Out

## 2021-05-07 ENCOUNTER — HOSPITAL ENCOUNTER (OUTPATIENT)
Age: 36
Discharge: HOME | End: 2021-05-07
Payer: COMMERCIAL

## 2021-05-07 DIAGNOSIS — E55.9 VITAMIN D DEFICIENCY: ICD-10-CM

## 2021-05-07 DIAGNOSIS — G62.9 NEUROPATHY: ICD-10-CM

## 2021-05-07 LAB
VITAMIN B-12: 850 PG/ML (ref 232–1245)
VITAMIN D 25-HYDROXY: 43 NG/ML (ref 30–100)

## 2021-05-07 PROCEDURE — 36415 COLL VENOUS BLD VENIPUNCTURE: CPT

## 2021-05-07 PROCEDURE — 82607 VITAMIN B-12: CPT

## 2021-05-07 PROCEDURE — 82306 VITAMIN D 25 HYDROXY: CPT

## 2021-05-10 ENCOUNTER — OFFICE VISIT (OUTPATIENT)
Dept: PRIMARY CARE CLINIC | Age: 36
End: 2021-05-10
Payer: COMMERCIAL

## 2021-05-10 VITALS
OXYGEN SATURATION: 94 % | DIASTOLIC BLOOD PRESSURE: 92 MMHG | SYSTOLIC BLOOD PRESSURE: 131 MMHG | WEIGHT: 173.2 LBS | HEIGHT: 63 IN | HEART RATE: 75 BPM | BODY MASS INDEX: 30.69 KG/M2 | TEMPERATURE: 97.3 F

## 2021-05-10 DIAGNOSIS — E66.9 OBESITY (BMI 30-39.9): ICD-10-CM

## 2021-05-10 DIAGNOSIS — M79.10 MYALGIA: ICD-10-CM

## 2021-05-10 DIAGNOSIS — G62.9 NEUROPATHY: Primary | ICD-10-CM

## 2021-05-10 PROCEDURE — 99213 OFFICE O/P EST LOW 20 MIN: CPT | Performed by: NURSE PRACTITIONER

## 2021-05-10 RX ORDER — AMITRIPTYLINE HYDROCHLORIDE 25 MG/1
12.5 TABLET, FILM COATED ORAL NIGHTLY
Qty: 30 TABLET | Refills: 1 | Status: SHIPPED | OUTPATIENT
Start: 2021-05-10 | End: 2022-06-02 | Stop reason: ALTCHOICE

## 2021-05-10 SDOH — ECONOMIC STABILITY: FOOD INSECURITY: WITHIN THE PAST 12 MONTHS, YOU WORRIED THAT YOUR FOOD WOULD RUN OUT BEFORE YOU GOT MONEY TO BUY MORE.: NEVER TRUE

## 2021-05-10 ASSESSMENT — ENCOUNTER SYMPTOMS
VOMITING: 0
WHEEZING: 0
ABDOMINAL PAIN: 0
COUGH: 1
TROUBLE SWALLOWING: 0
BLOOD IN STOOL: 0
RHINORRHEA: 0
SHORTNESS OF BREATH: 1
DIARRHEA: 0
CHOKING: 0

## 2021-05-10 ASSESSMENT — PATIENT HEALTH QUESTIONNAIRE - PHQ9
SUM OF ALL RESPONSES TO PHQ QUESTIONS 1-9: 0
SUM OF ALL RESPONSES TO PHQ QUESTIONS 1-9: 0

## 2021-05-10 NOTE — PROGRESS NOTES
[facility]  [id number] [facility] [address]  Dept: [phone]  Dept Fax: [phone]    Patient Care Team:  MIRA Chaidez CNP as PCP - General (Family Medicine)  MIRA Chaidez CNP as PCP - [facility] Empaneled Provider    5/10/2021     Henri Westbrook (:  1985)is a 38 y.o. female, here for evaluation of the following medical concerns:   Chief Complaint   Patient presents with    Forms     FMLA       Henri Westbrook is here today for routine follow-up for neuropathy and myalgias. She recently stopped taking gabapentin, admits to side effects of grogginess. She had difficulty waking up in the morning. She has previously had an evaluation with rheumatology, at the time she was pregnant and cannot get a full work-up. Child is now 5 months old.    + overall joints pains  Feels hands are swollen in the AM  Gets N/T in hands and legs, usually occurs after sleeping or sitting for a long time  Feels stiff after sitting, also feels stiff in the AM, resolves in about 20-30 minutes  Used motrin in the past.  No family hx of RA, SLE or sarcoidosis  Had labs done with a positive HAILY. Has July appt with Dr Ken Duenas            . Review of Systems   Constitutional: Negative for appetite change, chills, fatigue, fever and unexpected weight change. HENT: Negative for congestion, hearing loss, postnasal drip, rhinorrhea and trouble swallowing. Eyes: Negative for visual disturbance. Respiratory: Positive for cough and shortness of breath (after going up stairs). Negative for choking and wheezing. Cardiovascular: Negative for chest pain and palpitations. Gastrointestinal: Negative for abdominal pain, blood in stool, diarrhea and vomiting. Endocrine: Negative for polydipsia and polyuria. Genitourinary: Negative for frequency and hematuria. Musculoskeletal: Positive for arthralgias.  Negative for myalgias and neck pain. Swelling in legs   Skin: Negative for rash. Allergic/Immunologic: Negative for environmental allergies. Neurological: Positive for numbness. Negative for dizziness, seizures, syncope and headaches. Hematological: Does not bruise/bleed easily. Psychiatric/Behavioral: Negative for suicidal ideas. The patient is not nervous/anxious. Prior to Visit Medications    Medication Sig Taking? Authorizing Provider   amitriptyline (ELAVIL) 25 MG tablet Take 0.5 tablets by mouth nightly Yes MIRA Perez CNP   Prenatal Vit-Fe Fumarate-FA (PRENATAL 19 PO) Take by mouth Yes Historical Provider, MD        Social History     Tobacco Use    Smoking status: Never Smoker    Smokeless tobacco: Never Used   Substance Use Topics    Alcohol use: Yes     Comment: ocasional        Vitals:    05/10/21 1624 05/10/21 1700   BP: (!) 129/91 (!) 131/92   Site: Right Upper Arm    Position: Sitting    Cuff Size: Medium Adult    Pulse: 68 75   Temp: 97.3 °F (36.3 °C)    TempSrc: Temporal    SpO2: 94%    Weight: 173 lb 3.2 oz (78.6 kg)    Height: 5' 3\" (1.6 m)      Estimated body mass index is 30.68 kg/m² as calculated from the following:    Height as of this encounter: 5' 3\" (1.6 m). Weight as of this encounter: 173 lb 3.2 oz (78.6 kg). DIAGNOSTIC FINDINGS:  CBC:  Lab Results   Component Value Date    WBC 5.8 04/08/2019    HGB 14.0 04/08/2019     04/08/2019       BMP:    Lab Results   Component Value Date     04/08/2019    K 4.5 04/08/2019     04/08/2019    CO2 25 04/08/2019    BUN 10 04/08/2019    CREATININE 0.52 04/08/2019    GLUCOSE 97 04/08/2019       HEMOGLOBIN A1C: No results found for: LABA1C    FASTING LIPID PANEL:No results found for: CHOL, HDL, TRIG    Physical Exam  Vitals signs and nursing note reviewed. Constitutional:       General: She is not in acute distress. Appearance: She is well-developed. HENT:      Head: Normocephalic.    Eyes: General: No scleral icterus. Pupils: Pupils are equal, round, and reactive to light. Neck:      Musculoskeletal: Normal range of motion and neck supple. Cardiovascular:      Rate and Rhythm: Normal rate and regular rhythm. Pulmonary:      Effort: Pulmonary effort is normal.      Breath sounds: Normal breath sounds. Abdominal:      Palpations: Abdomen is soft. Skin:     General: Skin is warm and dry. Neurological:      Mental Status: She is alert and oriented to person, place, and time. Coordination: Coordination normal.   Psychiatric:         Behavior: Behavior normal. Behavior is cooperative. Thought Content: Thought content normal.         Judgment: Judgment normal.         ASSESSMENT     Diagnosis Orders   1. Neuropathy  amitriptyline (ELAVIL) 25 MG tablet   2. Myalgia     3. Obesity (BMI 30-39. 9)            PLAN:  Orders Placed This Encounter   Medications    amitriptyline (ELAVIL) 25 MG tablet     Sig: Take 0.5 tablets by mouth nightly     Dispense:  30 tablet     Refill:  1         1. Vit D and B 12 both recently checked, WNL/ B 12 at 850, Vit D at 43  2. We will continue [facility] paperwork as previously completed, reviewed expected times off with the patient. 3. Discussed treatment options for neuropathy. Advised patient most medications have potential side effect of grogginess. Will start amitriptyline half tablet at bedtime with potential of increasing to full tablet. Close follow-up in 4 weeks to reevaluate. FOLLOW UP AND INSTRUCTIONS:  Return in about 4 weeks (around 6/7/2021) for VV for neuropathy. · Henri received counseling on the following healthy behaviors:nutrition, exercise and medication adherence    · Discussed use, benefit, and side effects of prescribed medications. Barriers to  medication compliance addressed. All patient questions answered. Pt  verbalized understanding of all instructions given.     · Patient given educational materials - see patient instructions      · Patient advised to contact scheduling offices for any referrals or imaging orders  placed today if they have not been contacted in 48 hours. Return in about 4 weeks (around 6/7/2021) for VV for neuropathy. An electronic signature was used to authenticate this note. --MIRA Doe CNP on 5/10/2021 at 5:03 PM  Visit Information    Have you changed or started any medications since your last visit including any over-the-counter medicines, vitamins, or herbal medicines? no   Are you having any side effects from any of your medications? -  yes - Gabapentin-Fatigue  Have you stopped taking any of your medications? Is so, why? -  yes -     Have you seen any other physician or provider since your last visit? No  Have you had any other diagnostic tests since your last visit? No  Have you been seen in the emergency room and/or had an admission to a hospital since we last saw you? No  Have you had your routine dental cleaning in the past 6 months? yes -     Have you activated your [employer] account? If not, what are your barriers?  Yes     Patient Care Team:  MIRA Doe CNP as PCP - General (Family Medicine)  MIRA Doe CNP as PCP - [facility] Empaneled Provider    Medical History Review  Past Medical, Family, and Social History reviewed and does not contribute to the patient presenting condition    Health Maintenance   Topic Date Due    Hepatitis C screen  Never done    Varicella vaccine (1 of 2 - 2-dose childhood series) Never done    HIV screen  Never done    COVID-19 Vaccine (1) Never done    Cervical cancer screen  Never done    Flu vaccine (Season Ended) 09/01/2021    DTaP/Tdap/Td vaccine (2 - Td) 05/08/2030    Hepatitis A vaccine  Aged Out    Hepatitis B vaccine  Aged Out    Hib vaccine  Aged Out    Meningococcal (ACWY) vaccine  Aged Out    Pneumococcal 0-64 years Vaccine  Aged Out

## 2022-06-02 ENCOUNTER — OFFICE VISIT (OUTPATIENT)
Dept: PRIMARY CARE CLINIC | Age: 37
End: 2022-06-02
Payer: COMMERCIAL

## 2022-06-02 VITALS
DIASTOLIC BLOOD PRESSURE: 89 MMHG | TEMPERATURE: 97 F | SYSTOLIC BLOOD PRESSURE: 127 MMHG | OXYGEN SATURATION: 98 % | HEART RATE: 85 BPM

## 2022-06-02 DIAGNOSIS — E55.9 VITAMIN D DEFICIENCY: ICD-10-CM

## 2022-06-02 DIAGNOSIS — E66.9 OBESITY (BMI 30-39.9): ICD-10-CM

## 2022-06-02 DIAGNOSIS — G47.19 EXCESSIVE DAYTIME SLEEPINESS: Primary | ICD-10-CM

## 2022-06-02 DIAGNOSIS — Z13.1 DIABETES MELLITUS SCREENING: ICD-10-CM

## 2022-06-02 DIAGNOSIS — S29.012D UPPER BACK STRAIN, SUBSEQUENT ENCOUNTER: ICD-10-CM

## 2022-06-02 PROCEDURE — 99213 OFFICE O/P EST LOW 20 MIN: CPT | Performed by: NURSE PRACTITIONER

## 2022-06-02 SDOH — ECONOMIC STABILITY: FOOD INSECURITY: WITHIN THE PAST 12 MONTHS, THE FOOD YOU BOUGHT JUST DIDN'T LAST AND YOU DIDN'T HAVE MONEY TO GET MORE.: NEVER TRUE

## 2022-06-02 SDOH — ECONOMIC STABILITY: FOOD INSECURITY: WITHIN THE PAST 12 MONTHS, YOU WORRIED THAT YOUR FOOD WOULD RUN OUT BEFORE YOU GOT MONEY TO BUY MORE.: NEVER TRUE

## 2022-06-02 ASSESSMENT — PATIENT HEALTH QUESTIONNAIRE - PHQ9
SUM OF ALL RESPONSES TO PHQ QUESTIONS 1-9: 0
SUM OF ALL RESPONSES TO PHQ9 QUESTIONS 1 & 2: 0
SUM OF ALL RESPONSES TO PHQ QUESTIONS 1-9: 0
2. FEELING DOWN, DEPRESSED OR HOPELESS: 0
SUM OF ALL RESPONSES TO PHQ QUESTIONS 1-9: 0
1. LITTLE INTEREST OR PLEASURE IN DOING THINGS: 0
SUM OF ALL RESPONSES TO PHQ QUESTIONS 1-9: 0

## 2022-06-02 ASSESSMENT — ENCOUNTER SYMPTOMS
RHINORRHEA: 0
SHORTNESS OF BREATH: 1
WHEEZING: 0
VOMITING: 0
CHOKING: 0
DIARRHEA: 0
TROUBLE SWALLOWING: 0
BLOOD IN STOOL: 0
ABDOMINAL PAIN: 0
COUGH: 1

## 2022-06-02 ASSESSMENT — SOCIAL DETERMINANTS OF HEALTH (SDOH): HOW HARD IS IT FOR YOU TO PAY FOR THE VERY BASICS LIKE FOOD, HOUSING, MEDICAL CARE, AND HEATING?: NOT HARD AT ALL

## 2022-06-02 NOTE — PROGRESS NOTES
[facility]  [address] [address]  Dept: [phone]  Dept Fax: [phone]    Henri Westbrook (:  1985) is a 43 y.o. female,Established patient, here for evaluation of the following chief complaint(s):  Follow-up (Fmla), Other (fall asleep alot after she eats and ppl have to ewake her up ), and Cough         ASSESSMENT/PLAN:  1. Excessive daytime sleepiness  -     TSH With Reflex Ft4; Future  -     CBC with Auto Differential; Future  -     Baseline Diagnostic Sleep Study; Future  2. Obesity (BMI 30-39.9)  3. Diabetes mellitus screening  -     Basic Metabolic Panel, Fasting; Future  4. Vitamin D deficiency  -     Vitamin D 25 Hydroxy; Future  5. Upper back strain, subsequent encounter  -     [address] as above. High suspicion for sleep disorder and sleep apnea. Patient agreeable to PSG testing, order placed    Pain appears musculoskeletal, agreeable to physical therapy. Continue annual FMLA for myalgias, following routinely with rheumatology. Return in about 4 weeks (around 2022) for bp recheck. Subjective   SUBJECTIVE/OBJECTIVE:  In ER at [facility] last month. Was having sharp mid back pain with SOB. After 30 min, decided to go to the ER  Had an EKG and it was normal.  Told it was muscular. Occurs randomly, but at least once a day. Typically with activity. Occurs between her shoulder blades and also shoots into her lower back, chronic stiffness and low back pain    Works at [employer], a lot of reaching over head    Follows with Rheumatology, needs annual FMLA updated  Goes every 3-6 months to Dr Chin Grimaldo    Worried about elevated BP. States she \"falls asleep\" right after meals. Any meal and can be asleep for 30-40 minutes. Admits to sleeping well at night, does have some disruption d/t pain. Gets 6-7 hours a night.  Does not feel rested    + headaches, no chest pain or chest pressure  + dizziness. Review of Systems   Constitutional: Positive for fatigue. Negative for appetite change, chills, fever and unexpected weight change. HENT: Negative for congestion, hearing loss, postnasal drip, rhinorrhea and trouble swallowing. Eyes: Negative for visual disturbance. Respiratory: Positive for cough and shortness of breath (after going up stairs). Negative for choking and wheezing. Cardiovascular: Negative for chest pain and palpitations. Gastrointestinal: Negative for abdominal pain, blood in stool, diarrhea and vomiting. Endocrine: Negative for polydipsia and polyuria. Genitourinary: Negative for frequency and hematuria. Musculoskeletal: Positive for arthralgias. Negative for myalgias and neck pain. Swelling in legs   Skin: Negative for rash. Allergic/Immunologic: Negative for environmental allergies. Neurological: Positive for numbness. Negative for dizziness, seizures, syncope and headaches. Hematological: Does not bruise/bleed easily. Psychiatric/Behavioral: Negative for suicidal ideas. The patient is not nervous/anxious. Objective     DIAGNOSTIC FINDINGS:  CBC:  Lab Results   Component Value Date    WBC 5.8 04/08/2019    HGB 14.0 04/08/2019     04/08/2019       BMP:    Lab Results   Component Value Date     04/08/2019    K 4.5 04/08/2019     04/08/2019    CO2 25 04/08/2019    BUN 10 04/08/2019    CREATININE 0.52 04/08/2019    GLUCOSE 97 04/08/2019       HEMOGLOBIN A1C: No results found for: LABA1C    FASTING LIPID PANEL:No results found for: CHOL, HDL, TRIG    Physical Exam  Vitals and nursing note reviewed. Constitutional:       General: She is not in acute distress. Appearance: She is well-developed. HENT:      Head: Normocephalic. Eyes:      General: No scleral icterus. Pupils: Pupils are equal, round, and reactive to light.    Cardiovascular:      Rate and Rhythm: Normal rate and regular rhythm. Pulmonary:      Effort: Pulmonary effort is normal.      Breath sounds: Normal breath sounds. Abdominal:      Palpations: Abdomen is soft. Musculoskeletal:      Cervical back: Normal range of motion and neck supple. Comments: No thoracic paraspinal tenderness   Skin:     General: Skin is warm and dry. Neurological:      Mental Status: She is alert and oriented to person, place, and time. Coordination: Coordination normal.   Psychiatric:         Behavior: Behavior normal. Behavior is cooperative. Thought Content: Thought content normal.         Judgment: Judgment normal.            An electronic signature was used to authenticate this note.     --MIRA Moscoso - CNP

## 2022-06-08 ENCOUNTER — HOSPITAL ENCOUNTER (OUTPATIENT)
Dept: PHYSICAL THERAPY | Facility: CLINIC | Age: 37
Setting detail: THERAPIES SERIES
Discharge: HOME | End: 2022-06-08
Payer: COMMERCIAL

## 2022-06-08 PROCEDURE — 97110 THERAPEUTIC EXERCISES: CPT

## 2022-06-08 PROCEDURE — 97161 PT EVAL LOW COMPLEX 20 MIN: CPT

## 2022-06-08 NOTE — CONSULTS
[] [facility]) - [facility]  [address]  [phone]  F: [phone] [x] [address]  [facility]   [address]  P: [phone]  F: [phone] [] [place]  [address]  P: [phone]  F: [phone] [] [address]  P: [phone]  F: [phone] [] [address]  [facility]   [address]   Rebecca Padronald: [phone]  F: [phone]      Physical Therapy Spine Evaluation    Date:  2022  Patient: Hema Westbrook  : 1985  MRN: [id number]  Physician: MIRA Rolle- CNP   Ins[facility]: [employer] [facility] elroy year   Medical Diagnosis: S29.012D (ICD-10-CM) - Upper back strain, subsequent encounter  Rehab Codes: S29.012D (ICD-10-CM) - Upper back strain, subsequent encounter; M62.81; M79.7   Onset Date: 2022  Next 's appt. 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 \"I couldn't breathe it hurt so bad. \"   Reports experiencing these symptoms in the past, and typically experiencing relief with utilizing a HP and ibuprofen.    However, now experiencing pain and symptoms within multiple joints- has been diagnosed with myalgia- plans to undergo blood work and follow-up with her rheumatologist.     HPI: 2022; see above     PMHx: [x] Unremarkable [] Diabetes [] HTN  [] Pacemaker   [] MI/Heart Problems [] Cancer [] Arthritis [] Other:              [x] Refer to full medical chart  In EPIC     Comorbidities: NONE  [] Obesity [] Dialysis  [] N/A   [] Asthma/COPD [] Dementia [] Other:   [] Stroke [] Sleep apnea [] Other:   [] Vascular disease [] Rheumatic disease [] Other:     Tests: [] X-Ray: [] MRI:  [x] Other: no imaging to thoracic or lumbar spines thus far. Medications: [x] Refer to full medical record [] None [] Other:  Allergies:      [x] Refer to full medical record [] None [] Other:    Function:  Hand Dominance  [] Right  [] Left  Employer [employer]   Job Status [x]  Normal duty   [] Light duty   [] Off due to condition    []  Retired   [] Not employed   [] Disability  [] Other:  []  Return to work: Work activities/duties \"Can take a break when needed- have to lift constantly; filling out my FMLA. \"      Pain:  [x] Yes  [] No Location: B thoracic, lumbar spine pain- muscular  Pain Rating: (0-10 scale) 4-8/10  Pain altered Tx:  [] Yes  [x] No  Action: N/A  Symptoms:  [] Improving [] Worsening [x] Same  Better:  [] AM    [] PM    [] Sit    [] Rise/Sit    []Stand    [] Walk    [] Lying    [] Other:  Worse: [] AM    [] PM    [] Sit    [] Rise/Sit    []Stand    [] Walk    [] Lying    [] Bend                      [] Valsalva    [] Other: \"sitting on the couch; sleep; sitting down throwing letters at work. \"   Sleep: [] OK    [x] Disturbed- inc waking secondary to P, but able to reposition and fall back asleep     \"Myalgia- through my rheumatologist.\"   \"Tingling all over- it's the joints; my back; and then between my shoulders. \"     Purchased new shoes, mattress- however- continued back pain. Reports intermittent numbness and tingling of multiple body areas and joints- but no specific pattern or aggravating factors. Denies groin N/T.      List of Red Flags:    Cervical Myelopathy Normal (-)/Abnormal (+)   Sensory disturbances in hand -   Wasting of hand muscles -   Unsteady gait -   Collado's reflex -   Hyper-reflexia -   Bladder and bowel problems- incontinence or constipation of urine or fecal matter +- constipation- instructed to notify referring MD or PT if worsens    Multi-segmental weakness and/or sensory disturbances -     Neoplastic Conditions Normal (-)/Abnormal (+)   Age > 48 y.o. -   Previous history of cancer -   Unexplained weight loss- >10lb in 1 week -   Constant pain -   No relief of pain with bed rest -   Night pain- not relieved with change in body position -     Objective:      STRENGTH  STRENGTH  ROM    Left Right  Left Right     C5 Shld Abd 4+ 4+ L1-2 Hip Flex 4+ 4+     Shld Flexion 4+ 4+ Hip Abd 4 4     Shld IR 4+ 4+ L3-4 Knee Ext 4+ 4+      Shld ER 4+ 4+ L4 Ankle DF 4+ 4+      C6 Elb Flex - - L5 EHL - -     C7 Elb Ext - - S1 Plant. Flex 4+ 4+ Lumbar    C8 EPL - - Abdominals POOR POOR Flexion FULL   T1 Fing Abd - - Erector Spinae N/t N/t Extension FULL    - - HS 4+ 4+ Rotation L FULL R  FULL      Hip IR/ER  4/4 4/4 Sidebend L FULL R  FULL   MT/RHOM 4 4    UE/LE     LT 4- 4-                                                       B shoulder AROM WNL and pain-free. Unable to perform T.A. activation despite cues of all types- modified to performing PPT. TESTS (+/-) LEFT RIGHT Not Tested   SLR [] sit [] supine   [x]   Hamstring (SLR)   [x]   SKTC   [x]   DKTC   [x]   Slump/Dural   [x]   SI JT   [x]   LAURIE   [x]   Joint Mobility   [x]     Special tests and nerve tension tests not appropriate at this time- as numbness and tingling does not follow a dermatomal pattern. OBSERVATION No Deficit Deficit Not Tested Comments   Posture       Forward Head [] [x] []    Rounded Shoulders [] [x] []    Kyphosis [x] [] []    Lordosis [x] [] []    Lateral Shift [x] [] []    Scoliosis [x] [] []    Slumped Sitting [x] [] []    Palpation [] [] [x] May consider assessment in future    Sensation [] [] [x]    Edema [] [] [x]    Neurological [] [] [x]      Functional Test: Modified oswestry  Score: 34% functionally impaired     Assessment:    Patient is a 43 y.o. 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  Patient demonstrates impairments and symptoms as detailed below in therapy goals. Patient currently limited in sitting, performing work tasks secondary to these impairments and increased symptoms. Patient would benefit from continued physical therapy services in order to address these impairments and symptoms, and return to QOL, ADLs, work. Anticipated frequency detailed above. Prognosis limited secondary to difficulty reproducing pain and symptoms during objective tests; potential fibro diagnosis- justifying a low complexity evaluation. If unable to achieve significant improvements within six to ten visits, will consult referring physician and consider a follow-up visit with said physician. Patient verbalized understanding and agreement to all aforementioned statements. Patient would benefit from skilled physical therapy services in order to: inc scapular, core, and B hip strength; and overall improve tolerance for sitting, job duties. Problems:    [x] ? Pain:  [] ? ROM:  [x] ? Strength:  [x] ? Function:  [] Other:      STG: (to be met in 6 treatments)  1. ? Pain: Patient will report < 5/10 pain at rest and < 8/10 pain with active movement in order to improve QOL. 2. ? Strength: Will demo GOOD T.A. or PPT in supine and sitting; 4+/5 MT/RHOMB; 4/5 LT; and 4/5 B hip ABD/IR/ER in order to protect thoracic and lumbar spine from future injury. 3. ? Function: Will sit 30 minutes with proper posture and lumbar support with < 5/10 P in order to tolerate rest breaks while at work, as well leisure activities while at home. 4. Patient to be independent with home exercise program as demonstrated by performance with correct form without cues. LTG: (to be met in 10 treatments)  1. Will perform job duties with < 5/10 P for improved QOL. 2. Improve modified oswestry to < 34% impairment/disability     Patient goals: \"heal and be comfortable. \"     Rehab Potential:  [] Good  [x] Fair  [] Poor   Suggested Professional Referral:  [] No  [x] Yes: potential back to referring MD if no success with PT services. Barriers to Goal Achievement:  [] No  [x] Yes: difficulty reproducing pain and symptoms during objective tests; potential fibro diagnosis. Domestic Concerns:  [x] No  [] Yes:    Pt. Education:  [x] Plans/Goals, Risks/Benefits discussed  [x] Home exercise program  Method of Education: [x] Verbal  [x] Demo  [x] Written  Comprehension of Education:  [x] Verbalizes understanding. [] Demonstrates understanding. [x] Needs Review. [] Demonstrates/verbalizes understanding of HEP/Ed previously given. Treatment Plan:  [x] Therapeutic Exercise   12567  [] Iontophoresis: 4 mg/mL Dexamethasone Sodium Phosphate  mAmin  37199   [x] Therapeutic Activity  72687 [] Vasopneumatic cold with compression  64806    [] Gait Training   90829 [] Ultrasound   71488   [] Neuromuscular Re-education  01879 [] Electrical Stimulation Unattended  37178   [x] Manual Therapy  84600 [] Electrical Stimulation Attended  07340   [x] Instruction in HEP  [] Lumbar/Cervical Traction  02166   [x] Aquatic Therapy   99619 [x] Cold/hotpack    [] Massage   41510      [] Dry Needling, 1 or 2 muscles  27602   [] Biofeedback, first 15 minutes   54080  [] Biofeedback, additional 15 minutes   62601 [] Dry Needling, 3 or more muscles  06238     []  Medication allergies reviewed for use of    Dexamethasone Sodium Phosphate 4mg/ml     with iontophoresis treatments. Pt is not allergic.     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 static holds  x5, 5\" ea  Encouraged

## 2022-06-29 ENCOUNTER — HOSPITAL ENCOUNTER (OUTPATIENT)
Age: 37
Discharge: HOME | End: 2022-06-29
Payer: COMMERCIAL

## 2022-06-29 DIAGNOSIS — Z13.1 DIABETES MELLITUS SCREENING: ICD-10-CM

## 2022-06-29 DIAGNOSIS — G47.19 EXCESSIVE DAYTIME SLEEPINESS: ICD-10-CM

## 2022-06-29 DIAGNOSIS — E55.9 VITAMIN D DEFICIENCY: ICD-10-CM

## 2022-06-29 LAB
ABSOLUTE EOS #: 0.11 K/UL (ref 0–0.44)
ABSOLUTE IMMATURE GRANULOCYTE: <0.03 K/UL (ref 0–0.3)
ABSOLUTE LYMPH #: 1.45 K/UL (ref 1.1–3.7)
ABSOLUTE MONO #: 0.43 K/UL (ref 0.1–1.2)
ANION GAP SERPL CALCULATED.3IONS-SCNC: 13 MMOL/L (ref 9–17)
BASOPHILS # BLD: 1 % (ref 0–2)
BASOPHILS ABSOLUTE: 0.04 K/UL (ref 0–0.2)
BUN BLDV-MCNC: 5 MG/DL (ref 6–20)
CALCIUM SERPL-MCNC: 9.2 MG/DL (ref 8.6–10.4)
CHLORIDE BLD-SCNC: 104 MMOL/L (ref 98–107)
CO2: 25 MMOL/L (ref 20–31)
CREAT SERPL-MCNC: 0.51 MG/DL (ref 0.5–0.9)
EOSINOPHILS RELATIVE PERCENT: 2 % (ref 1–4)
GFR AFRICAN AMERICAN: >60 ML/MIN
GFR NON-AFRICAN AMERICAN: >60 ML/MIN
GFR SERPL CREATININE-BSD FRML MDRD: ABNORMAL ML/MIN/{1.73_M2}
GLUCOSE FASTING: 103 MG/DL (ref 70–99)
HCT VFR BLD CALC: 42.8 % (ref 36.3–47.1)
HEMOGLOBIN: 13.8 G/DL (ref 11.9–15.1)
IMMATURE GRANULOCYTES: 0 %
LYMPHOCYTES # BLD: 27 % (ref 24–43)
MCH RBC QN AUTO: 27.3 PG (ref 25.2–33.5)
MCHC RBC AUTO-ENTMCNC: 32.2 G/DL (ref 28.4–34.8)
MCV RBC AUTO: 84.8 FL (ref 82.6–102.9)
MONOCYTES # BLD: 8 % (ref 3–12)
NRBC AUTOMATED: 0 PER 100 WBC
PDW BLD-RTO: 12.9 % (ref 11.8–14.4)
PLATELET # BLD: 244 K/UL (ref 138–453)
PMV BLD AUTO: 9.9 FL (ref 8.1–13.5)
POTASSIUM SERPL-SCNC: 4.7 MMOL/L (ref 3.7–5.3)
RBC # BLD: 5.05 M/UL (ref 3.95–5.11)
SEG NEUTROPHILS: 62 % (ref 36–65)
SEGMENTED NEUTROPHILS ABSOLUTE COUNT: 3.26 K/UL (ref 1.5–8.1)
SODIUM BLD-SCNC: 142 MMOL/L (ref 135–144)
TSH SERPL DL<=0.05 MIU/L-ACNC: 1.16 UIU/ML (ref 0.3–5)
VITAMIN D 25-HYDROXY: 49.2 NG/ML
WBC # BLD: 5.3 K/UL (ref 3.5–11.3)

## 2022-06-29 PROCEDURE — 36415 COLL VENOUS BLD VENIPUNCTURE: CPT

## 2022-06-29 PROCEDURE — 84443 ASSAY THYROID STIM HORMONE: CPT

## 2022-06-29 PROCEDURE — 82306 VITAMIN D 25 HYDROXY: CPT

## 2022-06-29 PROCEDURE — 80048 BASIC METABOLIC PNL TOTAL CA: CPT

## 2022-06-29 PROCEDURE — 85025 COMPLETE CBC W/AUTO DIFF WBC: CPT

## 2022-12-06 ENCOUNTER — OFFICE VISIT (OUTPATIENT)
Dept: PODIATRY | Age: 37
End: 2022-12-06

## 2022-12-06 VITALS — HEIGHT: 63 IN | WEIGHT: 171 LBS | BODY MASS INDEX: 30.3 KG/M2

## 2022-12-06 DIAGNOSIS — M67.472 GANGLION CYST OF LEFT FOOT: ICD-10-CM

## 2022-12-06 DIAGNOSIS — M79.672 LEFT FOOT PAIN: Primary | ICD-10-CM

## 2022-12-08 NOTE — PROGRESS NOTES
[address] PODIATRY [facility]  [address] [facility] [address] [place] [address]  Dept: [phone]  Dept Fax: [phone]    NEW PATIENT PROGRESS NOTE  Date of patient's visit: 12/8/2022  Patient's Name:  Hayder Westbrook YOB: 1985            Patient Care Team:  MIRA Obrien CNP as PCP - General (Family Medicine)  MIRA Obrien CNP as PCP - [facility] Empaneled Provider  Danni Rosenberg DPM as Physician (Podiatry)        Chief Complaint   Patient presents with    New Patient     To establish care    Foot Pain     Left dorsal foot x6 months         HPI:   Henri Cabrera is a 42 y.o. female who presents to the office today complaining of left top of the foot pain. Staes it hurts and she feels a slight growing mass to the area. Denies drauam .  Symptoms began 6 month(s) ago. Patient relates pain is Present. Pain is rated 6 out of 10 and is described as mild. Treatments prior to today's visit include: none today . Currently denies F/C/N/V. Pt's primary care physician is MIRA Obrien CNP last seen8/22/202     Allergies   Allergen Reactions    Latex     Other      Some selective fruits       No past medical history on file. Prior to Admission medications    Medication Sig Start Date End Date Taking? Authorizing Provider   Prenatal Vit-Fe Fumarate-FA (PRENATAL 19 PO) Take by mouth  Patient not taking: No sig reported    Historical Provider, MD       Past Surgical History:   Procedure Laterality Date    WISDOM TOOTH EXTRACTION         No family history on file.     Social History     Tobacco Use    Smoking status: Never    Smokeless tobacco: Never   Substance Use Topics    Alcohol use: Yes     Comment: ocasional       Review of Systems    Review of Systems:   History obtained from chart review and the patient  General ROS: negative for - chills, fatigue, fever, night sweats or weight gain  Constitutional: Negative for chills, diaphoresis, fatigue, fever and unexpected weight change. Musculoskeletal: Positive for arthralgias, gait problem and joint swelling. Neurological ROS: negative for - behavioral changes, confusion, headaches or seizures. Negative for weakness and numbness. Dermatological ROS: negative for - mole changes, rash  Cardiovascular: Negative for leg swelling. Gastrointestinal: Negative for constipation, diarrhea, nausea and vomiting. Lower Extremity Physical Examination:   Vitals: There were no vitals filed for this visit. General: AAO x 3 in NAD. Dermatologic Exam:  Skin lesion/ulceration Absent . Skin No rashes or nodules noted. .       Musculoskeletal:     1st MPJ ROM decreased, Bilateral.  Muscle strength 5/5, Bilateral.  Pain present upon palpation of left foot dorsal aspect of the 4th met base with a possible ganglion cyst .  Medial longitudinal arch, Bilateral WNL. Ankle ROM WNL,Bilateral.    Dorsally contracted digits absent digits 1-5 Bilateral.     Vascular: DP and PT pulses palpable 2/4, Bilateral.  CFT <3 seconds, Bilateral.  Hair growth present to the level of the digits, Bilateral.  Edema absent, Bilateral.  Varicosities absent, Bilateral. Erythema absent, Bilateral    Neurological: Sensation intact to light touch to level of digits, Bilateral.  Protective sensation intact 10/10 sites via 5.07/10g Chelsea-Eduar Monofilament, Bilateral.  negative Tinel's, Bilateral.  negative Valleix sign, Bilateral.      Integument: Warm, dry, supple, Bilateral.  Open lesion absent, Bilateral.  Interdigital maceration absent to web spaces 1-4, Bilateral.  Nails are normal in length, thickness and color 1-5 bilateral.  Fissures absent, Bilateral.     Radiographs:  3 views left foot:  no bony abnormality seen. No acute findings. No djd seen  Asessment: Patient is a 42 y.o. female with:    Diagnosis Orders   1.  Left foot pain  XR FOOT LEFT (MIN 3 VIEWS)    MRI FOOT LEFT WO CONTRAST Plan: Patient examined and evaluated. Current condition and treatment options discussed in detail. Discussed conservative and surgical options with the patient. Advised pt to her x rayfindings of the left foot      Due to the mass and the location of the mass I will need to get an MRI to see if this is a drainable abscess vs ganglion cyst.  This will be for possible surgical planning    . Verbal and written instructions given to patient. Contact office with any questions/problems/concerns. RTC in 1week(s) post MRI       .     12/6/2022    Electronically signed by Domingo Cm DPM on 12/6/2022

## 2022-12-14 ENCOUNTER — HOSPITAL ENCOUNTER (OUTPATIENT)
Dept: MRI IMAGING | Age: 37
Discharge: HOME | End: 2022-12-16
Payer: COMMERCIAL

## 2022-12-14 DIAGNOSIS — M79.672 LEFT FOOT PAIN: ICD-10-CM

## 2022-12-14 DIAGNOSIS — M67.472 GANGLION CYST OF LEFT FOOT: ICD-10-CM

## 2022-12-14 PROCEDURE — 73718 MRI LOWER EXTREMITY W/O DYE: CPT

## 2023-01-09 ENCOUNTER — TELEPHONE (OUTPATIENT)
Dept: PRIMARY CARE CLINIC | Age: 38
End: 2023-01-09

## 2023-01-09 NOTE — TELEPHONE ENCOUNTER
----- Message from Ellie Weaver sent at 1/6/2023  2:28 PM EST -----  Subject: Message to Provider    QUESTIONS  Information for Provider? 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 Please call the pt. Pt is covid   posititive  ---------------------------------------------------------------------------  --------------  Helen Cano [facility]  [phone]; OK to leave message on voicemail  ---------------------------------------------------------------------------  --------------  SCRIPT ANSWERS  Relationship to Patient?  Self

## 2023-01-09 NOTE — TELEPHONE ENCOUNTER
Called the patient- unable to leave a VM. When did the patient test positive and where? 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 [employer], are you okay with filling out the FMLA?

## 2023-01-09 NOTE — TELEPHONE ENCOUNTER
Pt notified- pt states she has a letter and no longer needs anything from our office regarding covid.

## 2023-01-09 NOTE — TELEPHONE ENCOUNTER
I will complete for standard 5 days after testing positive/symptoms start. Beyond that we did not treat or evaluate the patient for any prolonged course of Covid. Please confirm patient's symptom onset date or positive testing date. That will be day 0, and she will be excused through day 5 per CDC and health department return to work guidelines.

## 2023-01-19 ENCOUNTER — OFFICE VISIT (OUTPATIENT)
Dept: PRIMARY CARE CLINIC | Age: 38
End: 2023-01-19
Payer: COMMERCIAL

## 2023-01-19 VITALS
BODY MASS INDEX: 29.94 KG/M2 | DIASTOLIC BLOOD PRESSURE: 84 MMHG | WEIGHT: 169 LBS | SYSTOLIC BLOOD PRESSURE: 120 MMHG | OXYGEN SATURATION: 100 % | HEART RATE: 72 BPM

## 2023-01-19 DIAGNOSIS — M79.10 MYALGIA: Primary | ICD-10-CM

## 2023-01-19 PROCEDURE — 99213 OFFICE O/P EST LOW 20 MIN: CPT | Performed by: NURSE PRACTITIONER

## 2023-01-19 ASSESSMENT — PATIENT HEALTH QUESTIONNAIRE - PHQ9
1. LITTLE INTEREST OR PLEASURE IN DOING THINGS: 0
2. FEELING DOWN, DEPRESSED OR HOPELESS: 0
SUM OF ALL RESPONSES TO PHQ QUESTIONS 1-9: 0
SUM OF ALL RESPONSES TO PHQ9 QUESTIONS 1 & 2: 0
SUM OF ALL RESPONSES TO PHQ QUESTIONS 1-9: 0

## 2023-01-19 ASSESSMENT — ENCOUNTER SYMPTOMS
CHOKING: 0
SHORTNESS OF BREATH: 0
COUGH: 0
TROUBLE SWALLOWING: 0
BLOOD IN STOOL: 0
VOMITING: 0
WHEEZING: 0
RHINORRHEA: 0
DIARRHEA: 0
ABDOMINAL PAIN: 0

## 2023-01-19 NOTE — PROGRESS NOTES
[address] PRIMARY CARE  [facility] [address] [facility] [address] [address] [address]  Dept: [phone]  Dept Fax: [phone]    Henri Westbrook (:  1985) is a 42 y.o. female,Established patient, here for evaluation of the following chief complaint(s): Other (6 month follow up)         ASSESSMENT/PLAN:  1. Myalgia    Reschedule with rheumatology. Discussed gentle daily stretching such as beginners yoga for improved flexibility  FMLA completed    Return in about 6 months (around 2023). Subjective   SUBJECTIVE/OBJECTIVE:  Here for FMLA renewal  Occurs randomly, but at least once a day. Typically with activity. 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 Any meal and can be asleep for 30-40 minutes. Admits is often after a meal high in carbohydrate  Admits to sleeping well at night, does have some disruption d/t pain. Gets 6-7 hours a night. Does not feel rested. Did not complete PSG testing    Headaches resolved with improved hydration, no chest pain or chest pressure  No dizziness. Other  Associated symptoms include arthralgias, fatigue and numbness. Pertinent negatives include no abdominal pain, chest pain, chills, congestion, coughing, fever, headaches, myalgias, neck pain, rash or vomiting. Review of Systems   Constitutional:  Positive for fatigue. Negative for appetite change, chills, fever and unexpected weight change. HENT:  Negative for congestion, hearing loss, postnasal drip, rhinorrhea and trouble swallowing. Eyes:  Negative for visual disturbance. Respiratory:  Negative for cough, choking, shortness of breath and wheezing.     Cardiovascular: Negative for chest pain and palpitations. Gastrointestinal:  Negative for abdominal pain, blood in stool, diarrhea and vomiting. Endocrine: Negative for polydipsia and polyuria. Genitourinary:  Negative for dysuria, frequency and hematuria. Musculoskeletal:  Positive for arthralgias. Negative for myalgias and neck pain. Swelling in legs   Skin:  Negative for rash. Allergic/Immunologic: Negative for environmental allergies. Neurological:  Positive for numbness. Negative for dizziness, seizures, syncope and headaches. Hematological:  Does not bruise/bleed easily. Psychiatric/Behavioral:  Negative for suicidal ideas. The patient is not nervous/anxious. Objective     DIAGNOSTIC FINDINGS:  CBC:  Lab Results   Component Value Date/Time    WBC 5.3 06/29/2022 10:10 AM    HGB 13.8 06/29/2022 10:10 AM     06/29/2022 10:10 AM       BMP:    Lab Results   Component Value Date/Time     06/29/2022 10:10 AM    K 4.7 06/29/2022 10:10 AM     06/29/2022 10:10 AM    CO2 25 06/29/2022 10:10 AM    BUN 5 06/29/2022 10:10 AM    CREATININE 0.51 06/29/2022 10:10 AM    GLUCOSE 97 04/08/2019 03:15 PM       HEMOGLOBIN A1C: No results found for: LABA1C    FASTING LIPID PANEL:No results found for: CHOL, HDL, TRIG    Physical Exam  Vitals and nursing note reviewed. Constitutional:       General: She is not in acute distress. Appearance: She is well-developed. HENT:      Head: Normocephalic. Eyes:      General: No scleral icterus. Pupils: Pupils are equal, round, and reactive to light. Cardiovascular:      Rate and Rhythm: Normal rate and regular rhythm. Pulmonary:      Effort: Pulmonary effort is normal.      Breath sounds: Normal breath sounds. Abdominal:      Palpations: Abdomen is soft. Musculoskeletal:      Cervical back: Normal range of motion and neck supple. Comments: No thoracic paraspinal tenderness   Skin:     General: Skin is warm and dry.    Neurological: Mental Status: She is alert and oriented to person, place, and time. Coordination: Coordination normal.   Psychiatric:         Behavior: Behavior normal. Behavior is cooperative. Thought Content: Thought content normal.         Judgment: Judgment normal.          An electronic signature was used to authenticate this note.     --John Yang, APRN - CNP

## 2023-06-07 ENCOUNTER — TELEPHONE (OUTPATIENT)
Dept: PRIMARY CARE CLINIC | Age: 38
End: 2023-06-07

## 2023-06-07 NOTE — TELEPHONE ENCOUNTER
Pt called in to office to let us know that [facility] was telling her pages 2 and 4 of previous LA were not sent over. Faxed the whole [facility] document to the [facility] 651# on the paperwork.

## 2023-07-19 ENCOUNTER — OFFICE VISIT (OUTPATIENT)
Dept: PRIMARY CARE CLINIC | Age: 38
End: 2023-07-19
Payer: COMMERCIAL

## 2023-07-19 VITALS
HEART RATE: 79 BPM | DIASTOLIC BLOOD PRESSURE: 88 MMHG | OXYGEN SATURATION: 96 % | WEIGHT: 168 LBS | BODY MASS INDEX: 29.76 KG/M2 | SYSTOLIC BLOOD PRESSURE: 131 MMHG

## 2023-07-19 DIAGNOSIS — M79.10 MYALGIA: Primary | ICD-10-CM

## 2023-07-19 DIAGNOSIS — G47.19 EXCESSIVE DAYTIME SLEEPINESS: ICD-10-CM

## 2023-07-19 PROCEDURE — 99213 OFFICE O/P EST LOW 20 MIN: CPT | Performed by: NURSE PRACTITIONER

## 2023-07-19 SDOH — ECONOMIC STABILITY: FOOD INSECURITY: WITHIN THE PAST 12 MONTHS, THE FOOD YOU BOUGHT JUST DIDN'T LAST AND YOU DIDN'T HAVE MONEY TO GET MORE.: NEVER TRUE

## 2023-07-19 SDOH — ECONOMIC STABILITY: FOOD INSECURITY: WITHIN THE PAST 12 MONTHS, YOU WORRIED THAT YOUR FOOD WOULD RUN OUT BEFORE YOU GOT MONEY TO BUY MORE.: NEVER TRUE

## 2023-07-19 SDOH — ECONOMIC STABILITY: INCOME INSECURITY: HOW HARD IS IT FOR YOU TO PAY FOR THE VERY BASICS LIKE FOOD, HOUSING, MEDICAL CARE, AND HEATING?: SOMEWHAT HARD

## 2023-07-19 SDOH — ECONOMIC STABILITY: HOUSING INSECURITY
IN THE LAST 12 MONTHS, WAS THERE A TIME WHEN YOU DID NOT HAVE A STEADY PLACE TO SLEEP OR SLEPT IN A SHELTER (INCLUDING NOW)?: NO

## 2023-07-19 ASSESSMENT — ENCOUNTER SYMPTOMS
ABDOMINAL PAIN: 0
VOMITING: 0
COUGH: 0

## 2023-07-19 NOTE — PROGRESS NOTES
Cardiovascular:  Negative for chest pain. Gastrointestinal:  Negative for abdominal pain and vomiting. Musculoskeletal:  Positive for arthralgias. Negative for myalgias and neck pain. Skin:  Negative for rash. Neurological:  Positive for numbness. Negative for headaches. Objective     DIAGNOSTIC FINDINGS:  CBC:  Lab Results   Component Value Date/Time    WBC 5.3 06/29/2022 10:10 AM    HGB 13.8 06/29/2022 10:10 AM     06/29/2022 10:10 AM       BMP:    Lab Results   Component Value Date/Time     06/29/2022 10:10 AM    K 4.7 06/29/2022 10:10 AM     06/29/2022 10:10 AM    CO2 25 06/29/2022 10:10 AM    BUN 5 06/29/2022 10:10 AM    CREATININE 0.51 06/29/2022 10:10 AM    GLUCOSE 97 04/08/2019 03:15 PM       HEMOGLOBIN A1C: No results found for: LABA1C    FASTING LIPID PANEL:No results found for: CHOL, HDL, TRIG    Physical Exam       An electronic signature was used to authenticate this note.     --Felecia Tijerina, APRN - CNP

## 2023-07-31 ENCOUNTER — HOSPITAL ENCOUNTER (OUTPATIENT)
Age: 38
Discharge: HOME | End: 2023-07-31
Payer: COMMERCIAL

## 2023-07-31 DIAGNOSIS — M79.10 MYALGIA: ICD-10-CM

## 2023-07-31 LAB
ANION GAP SERPL CALCULATED.3IONS-SCNC: 13 MMOL/L (ref 9–17)
BUN SERPL-MCNC: 7 MG/DL (ref 6–20)
CALCIUM SERPL-MCNC: 9.3 MG/DL (ref 8.6–10.4)
CHLORIDE SERPL-SCNC: 104 MMOL/L (ref 98–107)
CO2 SERPL-SCNC: 24 MMOL/L (ref 20–31)
CREAT SERPL-MCNC: 0.6 MG/DL (ref 0.5–0.9)
GFR SERPL CREATININE-BSD FRML MDRD: >60 ML/MIN/1.73M2
GLUCOSE P FAST SERPL-MCNC: 96 MG/DL (ref 70–99)
POTASSIUM SERPL-SCNC: 4.6 MMOL/L (ref 3.7–5.3)
SODIUM SERPL-SCNC: 141 MMOL/L (ref 135–144)

## 2023-07-31 PROCEDURE — 80048 BASIC METABOLIC PNL TOTAL CA: CPT

## 2023-07-31 PROCEDURE — 36415 COLL VENOUS BLD VENIPUNCTURE: CPT

## 2023-10-03 ENCOUNTER — TELEPHONE (OUTPATIENT)
Dept: PRIMARY CARE CLINIC | Age: 38
End: 2023-10-03

## 2023-10-03 NOTE — TELEPHONE ENCOUNTER
Pt called into the office with /94, headaches, dizziness. Checked with PCP and she advised to patient to go to ED for evaluation as this is a new condition that has red flag words.

## 2023-10-19 ENCOUNTER — OFFICE VISIT (OUTPATIENT)
Dept: PRIMARY CARE CLINIC | Age: 38
End: 2023-10-19
Payer: COMMERCIAL

## 2023-10-19 ENCOUNTER — HOSPITAL ENCOUNTER (OUTPATIENT)
Age: 38
Setting detail: SPECIMEN
Discharge: HOME | End: 2023-10-19

## 2023-10-19 VITALS
OXYGEN SATURATION: 99 % | SYSTOLIC BLOOD PRESSURE: 135 MMHG | WEIGHT: 168.2 LBS | DIASTOLIC BLOOD PRESSURE: 92 MMHG | HEART RATE: 91 BPM | BODY MASS INDEX: 29.8 KG/M2

## 2023-10-19 DIAGNOSIS — R51.9 CHRONIC HEADACHE WITH NEW FEATURES: Primary | ICD-10-CM

## 2023-10-19 DIAGNOSIS — R30.0 DYSURIA: ICD-10-CM

## 2023-10-19 DIAGNOSIS — N30.00 ACUTE CYSTITIS WITHOUT HEMATURIA: ICD-10-CM

## 2023-10-19 DIAGNOSIS — G89.29 CHRONIC HEADACHE WITH NEW FEATURES: Primary | ICD-10-CM

## 2023-10-19 DIAGNOSIS — I10 PRIMARY HYPERTENSION: ICD-10-CM

## 2023-10-19 PROBLEM — E66.9 OBESITY (BMI 30.0-34.9): Status: ACTIVE | Noted: 2020-01-07

## 2023-10-19 PROBLEM — D89.89 AUTOIMMUNE DISORDER (HCC): Status: ACTIVE | Noted: 2019-11-25

## 2023-10-19 PROBLEM — E66.811 OBESITY (BMI 30.0-34.9): Status: ACTIVE | Noted: 2020-01-07

## 2023-10-19 LAB
BILIRUBIN, POC: ABNORMAL
BLOOD URINE, POC: ABNORMAL
CLARITY, POC: ABNORMAL
COLOR, POC: YELLOW
GLUCOSE URINE, POC: ABNORMAL
KETONES, POC: ABNORMAL
LEUKOCYTE EST, POC: 15
NITRITE, POC: ABNORMAL
PH, POC: 5.5
PROTEIN, POC: 15
SPECIFIC GRAVITY, POC: 1.03
UROBILINOGEN, POC: 0.2

## 2023-10-19 PROCEDURE — 3075F SYST BP GE 130 - 139MM HG: CPT | Performed by: NURSE PRACTITIONER

## 2023-10-19 PROCEDURE — 3080F DIAST BP >= 90 MM HG: CPT | Performed by: NURSE PRACTITIONER

## 2023-10-19 PROCEDURE — 81003 URINALYSIS AUTO W/O SCOPE: CPT | Performed by: NURSE PRACTITIONER

## 2023-10-19 PROCEDURE — 99213 OFFICE O/P EST LOW 20 MIN: CPT | Performed by: NURSE PRACTITIONER

## 2023-10-19 RX ORDER — NITROFURANTOIN 25; 75 MG/1; MG/1
100 CAPSULE ORAL 2 TIMES DAILY
Qty: 14 CAPSULE | Refills: 0 | Status: SHIPPED | OUTPATIENT
Start: 2023-10-19 | End: 2023-10-26

## 2023-10-19 RX ORDER — HYDROCHLOROTHIAZIDE 12.5 MG/1
12.5 CAPSULE, GELATIN COATED ORAL DAILY
Qty: 30 CAPSULE | Refills: 3 | Status: SHIPPED | OUTPATIENT
Start: 2023-10-19

## 2023-10-19 ASSESSMENT — ENCOUNTER SYMPTOMS
SHORTNESS OF BREATH: 0
COUGH: 0
WHEEZING: 0
CHEST TIGHTNESS: 0

## 2023-10-19 NOTE — PROGRESS NOTES
Negative for cough, chest tightness, shortness of breath and wheezing. Cardiovascular:  Negative for chest pain and leg swelling. Neurological:  Positive for headaches. Negative for dizziness and syncope. Objective     DIAGNOSTIC FINDINGS:  CBC:  Lab Results   Component Value Date/Time    WBC 5.3 06/29/2022 10:10 AM    HGB 13.8 06/29/2022 10:10 AM     06/29/2022 10:10 AM       BMP:    Lab Results   Component Value Date/Time     07/31/2023 10:35 AM    K 4.6 07/31/2023 10:35 AM     07/31/2023 10:35 AM    CO2 24 07/31/2023 10:35 AM    BUN 7 07/31/2023 10:35 AM    CREATININE 0.6 07/31/2023 10:35 AM    GLUCOSE 97 04/08/2019 03:15 PM       HEMOGLOBIN A1C: No results found for: \"LABA1C\"    FASTING LIPID PANEL:No results found for: \"CHOL\", \"HDL\", \"TRIG\"    Physical Exam  Constitutional:       Appearance: Normal appearance. She is not ill-appearing or toxic-appearing. HENT:      Head: Normocephalic. Mouth/Throat:      Mouth: Mucous membranes are moist.   Eyes:      General: No scleral icterus. Pupils: Pupils are equal, round, and reactive to light. Cardiovascular:      Rate and Rhythm: Normal rate. Pulmonary:      Effort: Pulmonary effort is normal.      Breath sounds: Normal breath sounds. Abdominal:      General: There is no distension. Palpations: Abdomen is soft. Tenderness: There is no abdominal tenderness. There is no right CVA tenderness, left CVA tenderness or guarding. Skin:     General: Skin is warm. Neurological:      Mental Status: She is alert and oriented to person, place, and time. An electronic signature was used to authenticate this note.     --Eileen Carlos, APRN - CNP

## 2023-10-20 DIAGNOSIS — R30.0 DYSURIA: ICD-10-CM

## 2023-10-20 DIAGNOSIS — N30.00 ACUTE CYSTITIS WITHOUT HEMATURIA: ICD-10-CM

## 2023-10-20 LAB
CANDIDA SPECIES: NEGATIVE
GARDNERELLA VAGINALIS: NEGATIVE
SOURCE: NORMAL
TRICHOMONAS: NEGATIVE

## 2023-10-21 LAB
MICROORGANISM SPEC CULT: ABNORMAL
SPECIMEN DESCRIPTION: ABNORMAL

## 2024-03-12 RX ORDER — HYDROCHLOROTHIAZIDE 12.5 MG/1
12.5 CAPSULE, GELATIN COATED ORAL DAILY
Qty: 30 CAPSULE | Refills: 3 | Status: SHIPPED | OUTPATIENT
Start: 2024-03-12 | End: 2024-03-13 | Stop reason: SDUPTHER

## 2024-03-13 ENCOUNTER — OFFICE VISIT (OUTPATIENT)
Dept: PRIMARY CARE CLINIC | Age: 39
End: 2024-03-13
Payer: COMMERCIAL

## 2024-03-13 VITALS
HEIGHT: 63 IN | OXYGEN SATURATION: 100 % | WEIGHT: 167 LBS | BODY MASS INDEX: 29.59 KG/M2 | HEART RATE: 64 BPM | SYSTOLIC BLOOD PRESSURE: 137 MMHG | DIASTOLIC BLOOD PRESSURE: 94 MMHG

## 2024-03-13 DIAGNOSIS — G89.29 CHRONIC HEADACHE WITH NEW FEATURES: ICD-10-CM

## 2024-03-13 DIAGNOSIS — I10 PRIMARY HYPERTENSION: Primary | ICD-10-CM

## 2024-03-13 DIAGNOSIS — R51.9 CHRONIC HEADACHE WITH NEW FEATURES: ICD-10-CM

## 2024-03-13 DIAGNOSIS — Z13.220 LIPID SCREENING: ICD-10-CM

## 2024-03-13 DIAGNOSIS — D89.89 AUTOIMMUNE DISORDER (HCC): ICD-10-CM

## 2024-03-13 PROCEDURE — 3075F SYST BP GE 130 - 139MM HG: CPT | Performed by: NURSE PRACTITIONER

## 2024-03-13 PROCEDURE — 99214 OFFICE O/P EST MOD 30 MIN: CPT | Performed by: NURSE PRACTITIONER

## 2024-03-13 PROCEDURE — 3080F DIAST BP >= 90 MM HG: CPT | Performed by: NURSE PRACTITIONER

## 2024-03-13 RX ORDER — HYDROCHLOROTHIAZIDE 12.5 MG/1
12.5 CAPSULE, GELATIN COATED ORAL DAILY
Qty: 30 CAPSULE | Refills: 3 | Status: SHIPPED | OUTPATIENT
Start: 2024-03-13

## 2024-03-13 ASSESSMENT — PATIENT HEALTH QUESTIONNAIRE - PHQ9
SUM OF ALL RESPONSES TO PHQ QUESTIONS 1-9: 0
1. LITTLE INTEREST OR PLEASURE IN DOING THINGS: 0
2. FEELING DOWN, DEPRESSED OR HOPELESS: 0
SUM OF ALL RESPONSES TO PHQ9 QUESTIONS 1 & 2: 0
SUM OF ALL RESPONSES TO PHQ QUESTIONS 1-9: 0

## 2024-03-13 NOTE — PROGRESS NOTES
[facility]  [facility] PRIMARY CARE  [facility] MAIN FLOOR  [facility] [address]  Dept: [phone]  Dept Fax: [phone]    Henri Westbrook (:  1985) is a 38 y.o. female,Established patient, here for evaluation of the following chief complaint(s):  Follow-up (Patient is here today for a follow up )         ASSESSMENT/PLAN:  1. Primary hypertension  2. Autoimmune disorder (HCC)  3. Chronic headache with new features  4. Lipid screening  -     Lipid, Fasting; Future    Blood pressure mildly elevated as she is out of medication, consider modification of treatment at next visit if she remains elevated.  Routine labs ordered  Patient plans to schedule for cervical cancer screening with her [facility] gynecologist    Return in about 6 months (around 2024) for HTN.         Subjective   SUBJECTIVE/OBJECTIVE:  Henri Westbrook states she is here today for routine follow-up.  She says that she has been out of medication for the last few days    She has no major concerns today, feels her headaches are well-controlled    She has an appointment coming up with her rheumatologist for further evaluation of positive HAILY, however she does not have an active diagnosis.          Review of Systems       Objective     DIAGNOSTIC FINDINGS:  CBC:  Lab Results   Component Value Date/Time    WBC 5.3 2022 10:10 AM    HGB 13.8 2022 10:10 AM     2022 10:10 AM       BMP:    Lab Results   Component Value Date/Time     2023 10:35 AM    K 4.6 2023 10:35 AM     2023 10:35 AM    CO2 24 2023 10:35 AM    BUN 7 2023 10:35 AM    CREATININE 0.6 2023 10:35 AM    GLUCOSE 97 2019 03:15 PM       HEMOGLOBIN A1C: No results found for: \"LABA1C\"    FASTING LIPID PANEL:No results found for: \"CHOL\", \"HDL\", \"TRIG\"    Physical Exam  Constitutional:       Appearance: Normal appearance. She is not ill-appearing or

## 2024-06-24 ENCOUNTER — OFFICE VISIT (OUTPATIENT)
Dept: PRIMARY CARE CLINIC | Age: 39
End: 2024-06-24
Payer: COMMERCIAL

## 2024-06-24 VITALS
SYSTOLIC BLOOD PRESSURE: 121 MMHG | BODY MASS INDEX: 29.05 KG/M2 | WEIGHT: 164 LBS | DIASTOLIC BLOOD PRESSURE: 83 MMHG | HEART RATE: 69 BPM | OXYGEN SATURATION: 100 %

## 2024-06-24 DIAGNOSIS — L23.7 POISON IVY DERMATITIS: Primary | ICD-10-CM

## 2024-06-24 PROCEDURE — 99213 OFFICE O/P EST LOW 20 MIN: CPT | Performed by: NURSE PRACTITIONER

## 2024-06-24 RX ORDER — HYDROCHLOROTHIAZIDE 12.5 MG/1
12.5 CAPSULE, GELATIN COATED ORAL DAILY
Qty: 30 CAPSULE | Refills: 3 | Status: SHIPPED | OUTPATIENT
Start: 2024-06-24

## 2024-06-24 RX ORDER — BETAMETHASONE DIPROPIONATE 0.05 %
OINTMENT (GRAM) TOPICAL
Qty: 45 G | Refills: 0 | Status: SHIPPED | OUTPATIENT
Start: 2024-06-24

## 2024-06-24 ASSESSMENT — ENCOUNTER SYMPTOMS: URTICARIA: 1

## 2024-06-24 NOTE — PROGRESS NOTES
[facility]  [facility] PRIMARY CARE  [facility] MAIN FLOOR  [facility] [address]  Dept: [phone]  Dept Fax: [phone]    Henri Westbrook (:  1985) is a 38 y.o. female,Established patient, here for evaluation of the following chief complaint(s):  Urticaria (hives)      Assessment & Plan   ASSESSMENT/PLAN:  1. Poison ivy dermatitis  -     betamethasone dipropionate 0.05 % ointment; Apply topically 2 times daily., Disp-45 g, R-0, Normal    Rash appears consistent with a poison ivy dermatitis.  Patient was encouraged to use topical steroid twice daily for 10 to 14 days over the rash.  Can take oral Benadryl or apply aloe, other topicals for itching.  Do not apply topical steroid to face.  Patient verbalized understanding  Encouraged to wash all bedding in warm water to remove any of the oils from the plant and avoid continued exposure    Return if symptoms worsen or fail to improve.         Subjective   SUBJECTIVE/OBJECTIVE:  The patient states that she has had hive reactions before, however this appears different.  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        Urticaria  This is a new problem. The current episode started in the past 7 days (Tuesday). The affected locations include the right hand, right arm and right upper leg. The rash is characterized by itchiness and blistering.       Review of Systems       Objective     DIAGNOSTIC FINDINGS:  CBC:  Lab Results   Component Value Date/Time    WBC 5.37 2024 02:36 PM    HGB 14.5 2024 02:36 PM     2024 02:36 PM       BMP:    Lab Results   Component Value Date/Time     2023 10:35 AM    K 4.6 2023 10:35 AM     2023 10:35 AM    CO2 24 2023 10:35 AM    BUN 7 2023 10:35 AM    CREATININE 0.64 2024 02:36 PM    GLUCOSE 97 2019 03:15 PM

## 2024-06-27 ENCOUNTER — TELEPHONE (OUTPATIENT)
Dept: PRIMARY CARE CLINIC | Age: 39
End: 2024-06-27

## 2024-07-02 ENCOUNTER — TELEPHONE (OUTPATIENT)
Dept: PRIMARY CARE CLINIC | Age: 39
End: 2024-07-02

## 2024-07-02 NOTE — TELEPHONE ENCOUNTER
Could you please put that FMLA form back, back so I can correct it, or take a look at it and see if you are able to complete the renewal.  I misunderstood as there is no notification with the drop-off as to the purpose of the FMLA form

## 2024-07-02 NOTE — TELEPHONE ENCOUNTER
Pt states that was a separate matter. She needed the note for work for poison ivy. The [facility] is for her renewal.

## 2024-07-02 NOTE — TELEPHONE ENCOUNTER
Please advise the patient that I received her paperwork for FMLA.  However I cannot complete the paperwork as the qualifications for FMLA are an acute condition requiring her hospital stay or requiring 3 days or more of missed work.  The information she provided from the previous phone encounter stated that she missed the 2 days of work, therefore she does not qualify for completion of FMLA.

## 2025-01-07 ENCOUNTER — OFFICE VISIT (OUTPATIENT)
Dept: PRIMARY CARE CLINIC | Age: 40
End: 2025-01-07

## 2025-01-07 VITALS
TEMPERATURE: 97.2 F | DIASTOLIC BLOOD PRESSURE: 89 MMHG | SYSTOLIC BLOOD PRESSURE: 138 MMHG | BODY MASS INDEX: 28.66 KG/M2 | HEART RATE: 76 BPM | WEIGHT: 161.8 LBS

## 2025-01-07 DIAGNOSIS — D89.89 AUTOIMMUNE DISORDER (HCC): ICD-10-CM

## 2025-01-07 DIAGNOSIS — G89.29 CHRONIC BILATERAL LOW BACK PAIN WITHOUT SCIATICA: Primary | ICD-10-CM

## 2025-01-07 DIAGNOSIS — M79.10 MYALGIA: ICD-10-CM

## 2025-01-07 DIAGNOSIS — I10 PRIMARY HYPERTENSION: ICD-10-CM

## 2025-01-07 DIAGNOSIS — M54.50 CHRONIC BILATERAL LOW BACK PAIN WITHOUT SCIATICA: Primary | ICD-10-CM

## 2025-01-07 PROCEDURE — 99214 OFFICE O/P EST MOD 30 MIN: CPT | Performed by: NURSE PRACTITIONER

## 2025-01-07 PROCEDURE — 3075F SYST BP GE 130 - 139MM HG: CPT | Performed by: NURSE PRACTITIONER

## 2025-01-07 PROCEDURE — 3079F DIAST BP 80-89 MM HG: CPT | Performed by: NURSE PRACTITIONER

## 2025-01-07 RX ORDER — METHOCARBAMOL 750 MG/1
750 TABLET, FILM COATED ORAL 4 TIMES DAILY
Qty: 40 TABLET | Refills: 0 | Status: SHIPPED | OUTPATIENT
Start: 2025-01-07 | End: 2025-01-17

## 2025-01-07 SDOH — ECONOMIC STABILITY: FOOD INSECURITY: WITHIN THE PAST 12 MONTHS, THE FOOD YOU BOUGHT JUST DIDN'T LAST AND YOU DIDN'T HAVE MONEY TO GET MORE.: NEVER TRUE

## 2025-01-07 SDOH — ECONOMIC STABILITY: FOOD INSECURITY: WITHIN THE PAST 12 MONTHS, YOU WORRIED THAT YOUR FOOD WOULD RUN OUT BEFORE YOU GOT MONEY TO BUY MORE.: NEVER TRUE

## 2025-01-07 SDOH — ECONOMIC STABILITY: INCOME INSECURITY: HOW HARD IS IT FOR YOU TO PAY FOR THE VERY BASICS LIKE FOOD, HOUSING, MEDICAL CARE, AND HEATING?: NOT HARD AT ALL

## 2025-01-07 ASSESSMENT — PATIENT HEALTH QUESTIONNAIRE - PHQ9
SUM OF ALL RESPONSES TO PHQ QUESTIONS 1-9: 0
2. FEELING DOWN, DEPRESSED OR HOPELESS: NOT AT ALL
SUM OF ALL RESPONSES TO PHQ9 QUESTIONS 1 & 2: 0
SUM OF ALL RESPONSES TO PHQ QUESTIONS 1-9: 0
1. LITTLE INTEREST OR PLEASURE IN DOING THINGS: NOT AT ALL
SUM OF ALL RESPONSES TO PHQ QUESTIONS 1-9: 0
SUM OF ALL RESPONSES TO PHQ QUESTIONS 1-9: 0

## 2025-01-07 NOTE — PROGRESS NOTES
episode.  No results found for: \"CBCAUTODIF\", \"CMP\", \"LABA1C\", \"LIPIDPAN\"     Return in about 6 months (around 7/7/2025) for Pain.  No orders of the defined types were placed in this encounter.    Orders Placed This Encounter   Medications    methocarbamol (ROBAXIN-750) 750 MG tablet     Sig: Take 1 tablet by mouth 4 times daily for 10 days     Dispense:  40 tablet     Refill:  0       The patient (or guardian, if applicable) and other individuals in attendance with the patient were advised that Artificial Intelligence will be utilized during this visit to record, process the conversation to generate a clinical note, and support improvement of the AI technology. The patient (or guardian, if applicable) and other individuals in attendance at the appointment consented to the use of AI, including the recording.      Reviewed health maintenance, prior labs and imaging.   Patient given educational materials - see patient instructions.    Discussed use, benefit, and side effects of prescribed medications. Barriers to medication compliance addressed.   All patient questions answered.  Pt voiced understanding to plan of care.   Instructed to continue medications as discussed, healthy diet and exercise.    Patient agreed with treatment plan. Follow up as directed below.     This note is created with the assistance of a speech-recognition program. While intending to generate a document that actually reflects the content of the visit, no guarantees can be provided that every mistake has been identified and corrected by editing.    Electronically signed by MIRA Mar CNP, APRN-CNP on 1/7/2025 at 12:32 PM

## 2025-02-27 RX ORDER — HYDROCHLOROTHIAZIDE 12.5 MG/1
12.5 CAPSULE ORAL DAILY
Qty: 30 CAPSULE | Refills: 5 | Status: SHIPPED | OUTPATIENT
Start: 2025-02-27